# Patient Record
Sex: MALE | Race: WHITE | Employment: OTHER | URBAN - METROPOLITAN AREA
[De-identification: names, ages, dates, MRNs, and addresses within clinical notes are randomized per-mention and may not be internally consistent; named-entity substitution may affect disease eponyms.]

---

## 2020-07-07 ENCOUNTER — APPOINTMENT (OUTPATIENT)
Dept: GENERAL RADIOLOGY | Facility: CLINIC | Age: 40
DRG: 494 | End: 2020-07-07
Attending: EMERGENCY MEDICINE
Payer: COMMERCIAL

## 2020-07-07 ENCOUNTER — HOSPITAL ENCOUNTER (INPATIENT)
Facility: CLINIC | Age: 40
LOS: 2 days | Discharge: HOME | DRG: 494 | End: 2020-07-09
Attending: EMERGENCY MEDICINE | Admitting: ORTHOPAEDIC SURGERY
Payer: COMMERCIAL

## 2020-07-07 DIAGNOSIS — S82.202A CLOSED FRACTURE OF LEFT TIBIA AND FIBULA, INITIAL ENCOUNTER: ICD-10-CM

## 2020-07-07 DIAGNOSIS — S82.402A CLOSED FRACTURE OF LEFT TIBIA AND FIBULA, INITIAL ENCOUNTER: ICD-10-CM

## 2020-07-07 PROBLEM — S82.309A CLOSED FRACTURE OF DISTAL TIBIA: Status: ACTIVE | Noted: 2020-07-07

## 2020-07-07 LAB
ABO + RH BLD: NORMAL
ABO + RH BLD: NORMAL
ANION GAP SERPL CALCULATED.3IONS-SCNC: 6 MMOL/L (ref 3–14)
BASOPHILS # BLD AUTO: 0 10E9/L (ref 0–0.2)
BASOPHILS NFR BLD AUTO: 0 %
BLD GP AB SCN SERPL QL: NORMAL
BLOOD BANK CMNT PATIENT-IMP: NORMAL
BUN SERPL-MCNC: 16 MG/DL (ref 7–30)
CALCIUM SERPL-MCNC: 8.6 MG/DL (ref 8.5–10.1)
CHLORIDE SERPL-SCNC: 107 MMOL/L (ref 94–109)
CO2 SERPL-SCNC: 27 MMOL/L (ref 20–32)
CREAT SERPL-MCNC: 1.22 MG/DL (ref 0.66–1.25)
DIFFERENTIAL METHOD BLD: ABNORMAL
EOSINOPHIL # BLD AUTO: 0.6 10E9/L (ref 0–0.7)
EOSINOPHIL NFR BLD AUTO: 5 %
ERYTHROCYTE [DISTWIDTH] IN BLOOD BY AUTOMATED COUNT: 12.8 % (ref 10–15)
ERYTHROCYTE [DISTWIDTH] IN BLOOD BY AUTOMATED COUNT: 12.8 % (ref 10–15)
ETHANOL SERPL-MCNC: <0.01 G/DL
GFR SERPL CREATININE-BSD FRML MDRD: 74 ML/MIN/{1.73_M2}
GLUCOSE SERPL-MCNC: 146 MG/DL (ref 70–99)
HCT VFR BLD AUTO: 46.6 % (ref 40–53)
HCT VFR BLD AUTO: 46.8 % (ref 40–53)
HGB BLD-MCNC: 15.1 G/DL (ref 13.3–17.7)
HGB BLD-MCNC: 15.5 G/DL (ref 13.3–17.7)
LYMPHOCYTES # BLD AUTO: 3.6 10E9/L (ref 0.8–5.3)
LYMPHOCYTES NFR BLD AUTO: 30 %
MCH RBC QN AUTO: 27.8 PG (ref 26.5–33)
MCH RBC QN AUTO: 28.3 PG (ref 26.5–33)
MCHC RBC AUTO-ENTMCNC: 32.4 G/DL (ref 31.5–36.5)
MCHC RBC AUTO-ENTMCNC: 33.1 G/DL (ref 31.5–36.5)
MCV RBC AUTO: 85 FL (ref 78–100)
MCV RBC AUTO: 86 FL (ref 78–100)
METAMYELOCYTES # BLD: 0.1 10E9/L
METAMYELOCYTES NFR BLD MANUAL: 1 %
MONOCYTES # BLD AUTO: 0.7 10E9/L (ref 0–1.3)
MONOCYTES NFR BLD AUTO: 6 %
NEUTROPHILS # BLD AUTO: 6.9 10E9/L (ref 1.6–8.3)
NEUTROPHILS NFR BLD AUTO: 58 %
PLATELET # BLD AUTO: 282 10E9/L (ref 150–450)
PLATELET # BLD AUTO: 375 10E9/L (ref 150–450)
PLATELET # BLD EST: ABNORMAL 10*3/UL
POTASSIUM SERPL-SCNC: 3 MMOL/L (ref 3.4–5.3)
POTASSIUM SERPL-SCNC: 3.5 MMOL/L (ref 3.4–5.3)
RBC # BLD AUTO: 5.43 10E12/L (ref 4.4–5.9)
RBC # BLD AUTO: 5.48 10E12/L (ref 4.4–5.9)
RBC MORPH BLD: ABNORMAL
SARS-COV-2 PCR COMMENT: NORMAL
SARS-COV-2 RNA SPEC QL NAA+PROBE: NEGATIVE
SARS-COV-2 RNA SPEC QL NAA+PROBE: NORMAL
SODIUM SERPL-SCNC: 140 MMOL/L (ref 133–144)
SPECIMEN EXP DATE BLD: NORMAL
SPECIMEN SOURCE: NORMAL
SPECIMEN SOURCE: NORMAL
VARIANT LYMPHS BLD QL SMEAR: PRESENT
WBC # BLD AUTO: 11.9 10E9/L (ref 4–11)
WBC # BLD AUTO: 18.3 10E9/L (ref 4–11)

## 2020-07-07 PROCEDURE — 86900 BLOOD TYPING SEROLOGIC ABO: CPT | Performed by: EMERGENCY MEDICINE

## 2020-07-07 PROCEDURE — 25800030 ZZH RX IP 258 OP 636: Performed by: PHYSICIAN ASSISTANT

## 2020-07-07 PROCEDURE — 29505 APPLICATION LONG LEG SPLINT: CPT

## 2020-07-07 PROCEDURE — 25800030 ZZH RX IP 258 OP 636: Performed by: EMERGENCY MEDICINE

## 2020-07-07 PROCEDURE — 96375 TX/PRO/DX INJ NEW DRUG ADDON: CPT

## 2020-07-07 PROCEDURE — 96374 THER/PROPH/DIAG INJ IV PUSH: CPT

## 2020-07-07 PROCEDURE — 36415 COLL VENOUS BLD VENIPUNCTURE: CPT | Performed by: PHYSICIAN ASSISTANT

## 2020-07-07 PROCEDURE — 2W3RX1Z IMMOBILIZATION OF LEFT LOWER LEG USING SPLINT: ICD-10-PCS | Performed by: EMERGENCY MEDICINE

## 2020-07-07 PROCEDURE — 96376 TX/PRO/DX INJ SAME DRUG ADON: CPT

## 2020-07-07 PROCEDURE — 73600 X-RAY EXAM OF ANKLE: CPT | Mod: LT

## 2020-07-07 PROCEDURE — 86850 RBC ANTIBODY SCREEN: CPT | Performed by: EMERGENCY MEDICINE

## 2020-07-07 PROCEDURE — 86901 BLOOD TYPING SEROLOGIC RH(D): CPT | Performed by: EMERGENCY MEDICINE

## 2020-07-07 PROCEDURE — 85025 COMPLETE CBC W/AUTO DIFF WBC: CPT | Performed by: EMERGENCY MEDICINE

## 2020-07-07 PROCEDURE — 25000132 ZZH RX MED GY IP 250 OP 250 PS 637: Performed by: PHYSICIAN ASSISTANT

## 2020-07-07 PROCEDURE — C9803 HOPD COVID-19 SPEC COLLECT: HCPCS

## 2020-07-07 PROCEDURE — 96361 HYDRATE IV INFUSION ADD-ON: CPT

## 2020-07-07 PROCEDURE — 73590 X-RAY EXAM OF LOWER LEG: CPT | Mod: LT

## 2020-07-07 PROCEDURE — 36415 COLL VENOUS BLD VENIPUNCTURE: CPT | Performed by: ORTHOPAEDIC SURGERY

## 2020-07-07 PROCEDURE — 80048 BASIC METABOLIC PNL TOTAL CA: CPT | Performed by: EMERGENCY MEDICINE

## 2020-07-07 PROCEDURE — 25000128 H RX IP 250 OP 636: Performed by: EMERGENCY MEDICINE

## 2020-07-07 PROCEDURE — 80320 DRUG SCREEN QUANTALCOHOLS: CPT | Performed by: EMERGENCY MEDICINE

## 2020-07-07 PROCEDURE — 84132 ASSAY OF SERUM POTASSIUM: CPT | Performed by: ORTHOPAEDIC SURGERY

## 2020-07-07 PROCEDURE — 85027 COMPLETE CBC AUTOMATED: CPT | Performed by: PHYSICIAN ASSISTANT

## 2020-07-07 PROCEDURE — 12000000 ZZH R&B MED SURG/OB

## 2020-07-07 PROCEDURE — 68300004 ZZH PARTIAL TRAUMA W/O CC LEVEL III

## 2020-07-07 PROCEDURE — 99285 EMERGENCY DEPT VISIT HI MDM: CPT | Mod: 25

## 2020-07-07 PROCEDURE — U0003 INFECTIOUS AGENT DETECTION BY NUCLEIC ACID (DNA OR RNA); SEVERE ACUTE RESPIRATORY SYNDROME CORONAVIRUS 2 (SARS-COV-2) (CORONAVIRUS DISEASE [COVID-19]), AMPLIFIED PROBE TECHNIQUE, MAKING USE OF HIGH THROUGHPUT TECHNOLOGIES AS DESCRIBED BY CMS-2020-01-R: HCPCS | Performed by: EMERGENCY MEDICINE

## 2020-07-07 RX ORDER — POTASSIUM CHLORIDE 1.5 G/1.58G
20-40 POWDER, FOR SOLUTION ORAL
Status: DISCONTINUED | OUTPATIENT
Start: 2020-07-07 | End: 2020-07-09 | Stop reason: HOSPADM

## 2020-07-07 RX ORDER — AMOXICILLIN 250 MG
2 CAPSULE ORAL 2 TIMES DAILY
Status: DISCONTINUED | OUTPATIENT
Start: 2020-07-07 | End: 2020-07-08

## 2020-07-07 RX ORDER — IBUPROFEN 600 MG/1
600 TABLET, FILM COATED ORAL EVERY 6 HOURS PRN
Status: DISCONTINUED | OUTPATIENT
Start: 2020-07-07 | End: 2020-07-09 | Stop reason: HOSPADM

## 2020-07-07 RX ORDER — POTASSIUM CHLORIDE 1500 MG/1
20-40 TABLET, EXTENDED RELEASE ORAL
Status: DISCONTINUED | OUTPATIENT
Start: 2020-07-07 | End: 2020-07-09 | Stop reason: HOSPADM

## 2020-07-07 RX ORDER — POTASSIUM CL/LIDO/0.9 % NACL 10MEQ/0.1L
10 INTRAVENOUS SOLUTION, PIGGYBACK (ML) INTRAVENOUS
Status: DISCONTINUED | OUTPATIENT
Start: 2020-07-07 | End: 2020-07-09 | Stop reason: HOSPADM

## 2020-07-07 RX ORDER — POTASSIUM CHLORIDE 7.45 MG/ML
10 INJECTION INTRAVENOUS
Status: DISCONTINUED | OUTPATIENT
Start: 2020-07-07 | End: 2020-07-09 | Stop reason: HOSPADM

## 2020-07-07 RX ORDER — ONDANSETRON 2 MG/ML
4 INJECTION INTRAMUSCULAR; INTRAVENOUS ONCE
Status: COMPLETED | OUTPATIENT
Start: 2020-07-07 | End: 2020-07-07

## 2020-07-07 RX ORDER — ONDANSETRON 2 MG/ML
4 INJECTION INTRAMUSCULAR; INTRAVENOUS EVERY 6 HOURS PRN
Status: DISCONTINUED | OUTPATIENT
Start: 2020-07-07 | End: 2020-07-08

## 2020-07-07 RX ORDER — HYDROMORPHONE HYDROCHLORIDE 1 MG/ML
.3-.5 INJECTION, SOLUTION INTRAMUSCULAR; INTRAVENOUS; SUBCUTANEOUS
Status: DISCONTINUED | OUTPATIENT
Start: 2020-07-07 | End: 2020-07-08

## 2020-07-07 RX ORDER — ALBUTEROL SULFATE 90 UG/1
2 AEROSOL, METERED RESPIRATORY (INHALATION) EVERY 4 HOURS PRN
COMMUNITY

## 2020-07-07 RX ORDER — OXYCODONE HYDROCHLORIDE 5 MG/1
5-10 TABLET ORAL
Status: DISCONTINUED | OUTPATIENT
Start: 2020-07-07 | End: 2020-07-08

## 2020-07-07 RX ORDER — NALOXONE HYDROCHLORIDE 0.4 MG/ML
.1-.4 INJECTION, SOLUTION INTRAMUSCULAR; INTRAVENOUS; SUBCUTANEOUS
Status: DISCONTINUED | OUTPATIENT
Start: 2020-07-07 | End: 2020-07-08

## 2020-07-07 RX ORDER — ONDANSETRON 4 MG/1
4 TABLET, ORALLY DISINTEGRATING ORAL EVERY 6 HOURS PRN
Status: DISCONTINUED | OUTPATIENT
Start: 2020-07-07 | End: 2020-07-08

## 2020-07-07 RX ORDER — PROCHLORPERAZINE 25 MG
25 SUPPOSITORY, RECTAL RECTAL EVERY 12 HOURS PRN
Status: DISCONTINUED | OUTPATIENT
Start: 2020-07-07 | End: 2020-07-08

## 2020-07-07 RX ORDER — PROCHLORPERAZINE MALEATE 10 MG
10 TABLET ORAL EVERY 6 HOURS PRN
Status: DISCONTINUED | OUTPATIENT
Start: 2020-07-07 | End: 2020-07-08

## 2020-07-07 RX ORDER — AMOXICILLIN 250 MG
1 CAPSULE ORAL 2 TIMES DAILY
Status: DISCONTINUED | OUTPATIENT
Start: 2020-07-07 | End: 2020-07-08

## 2020-07-07 RX ORDER — LIDOCAINE 40 MG/G
CREAM TOPICAL
Status: DISCONTINUED | OUTPATIENT
Start: 2020-07-07 | End: 2020-07-08

## 2020-07-07 RX ORDER — ACETAMINOPHEN 325 MG/1
650 TABLET ORAL EVERY 4 HOURS PRN
Status: DISCONTINUED | OUTPATIENT
Start: 2020-07-07 | End: 2020-07-08

## 2020-07-07 RX ORDER — POTASSIUM CHLORIDE 29.8 MG/ML
20 INJECTION INTRAVENOUS
Status: DISCONTINUED | OUTPATIENT
Start: 2020-07-07 | End: 2020-07-09 | Stop reason: HOSPADM

## 2020-07-07 RX ORDER — SODIUM CHLORIDE 9 MG/ML
INJECTION, SOLUTION INTRAVENOUS CONTINUOUS
Status: DISCONTINUED | OUTPATIENT
Start: 2020-07-07 | End: 2020-07-08

## 2020-07-07 RX ADMIN — SODIUM CHLORIDE 1000 ML: 9 INJECTION, SOLUTION INTRAVENOUS at 11:13

## 2020-07-07 RX ADMIN — POTASSIUM CHLORIDE 40 MEQ: 1500 TABLET, EXTENDED RELEASE ORAL at 16:05

## 2020-07-07 RX ADMIN — HYDROMORPHONE HYDROCHLORIDE 1 MG: 1 INJECTION, SOLUTION INTRAMUSCULAR; INTRAVENOUS; SUBCUTANEOUS at 11:10

## 2020-07-07 RX ADMIN — ONDANSETRON 4 MG: 2 INJECTION INTRAMUSCULAR; INTRAVENOUS at 11:07

## 2020-07-07 RX ADMIN — SODIUM CHLORIDE: 9 INJECTION, SOLUTION INTRAVENOUS at 15:21

## 2020-07-07 RX ADMIN — HYDROMORPHONE HYDROCHLORIDE 1 MG: 1 INJECTION, SOLUTION INTRAMUSCULAR; INTRAVENOUS; SUBCUTANEOUS at 12:04

## 2020-07-07 RX ADMIN — OXYCODONE HYDROCHLORIDE 10 MG: 5 TABLET ORAL at 23:24

## 2020-07-07 RX ADMIN — OXYCODONE HYDROCHLORIDE 5 MG: 5 TABLET ORAL at 16:32

## 2020-07-07 RX ADMIN — ACETAMINOPHEN 650 MG: 325 TABLET, FILM COATED ORAL at 22:13

## 2020-07-07 RX ADMIN — POTASSIUM CHLORIDE 20 MEQ: 1500 TABLET, EXTENDED RELEASE ORAL at 18:50

## 2020-07-07 RX ADMIN — ONDANSETRON 4 MG: 2 INJECTION INTRAMUSCULAR; INTRAVENOUS at 12:04

## 2020-07-07 RX ADMIN — ACETAMINOPHEN 650 MG: 325 TABLET, FILM COATED ORAL at 16:32

## 2020-07-07 RX ADMIN — OXYCODONE HYDROCHLORIDE 5 MG: 5 TABLET ORAL at 20:19

## 2020-07-07 RX ADMIN — SENNOSIDES AND DOCUSATE SODIUM 1 TABLET: 8.6; 5 TABLET ORAL at 20:20

## 2020-07-07 ASSESSMENT — ENCOUNTER SYMPTOMS
HEADACHES: 0
CHEST TIGHTNESS: 0
ABDOMINAL PAIN: 0
NECK PAIN: 0
MYALGIAS: 0
WOUND: 0

## 2020-07-07 ASSESSMENT — MIFFLIN-ST. JEOR: SCORE: 1892.87

## 2020-07-07 ASSESSMENT — ACTIVITIES OF DAILY LIVING (ADL)
ADLS_ACUITY_SCORE: 20
ADLS_ACUITY_SCORE: 18

## 2020-07-07 NOTE — ED NOTES
Bed: ED33  Expected date: 7/7/20  Expected time: 10:38 AM  Means of arrival: Ambulance  Comments:  bv3 39M fall   Tib/fib defor

## 2020-07-07 NOTE — ED PROVIDER NOTES
History     Chief Complaint:  Fall    HPI   Damián Petit is a 39 year old male who presents to the ED via EMS for evaluation of leg pain after a fall. The patient reports that he fell off a seven foot ladder and landed on his left foot earlier this morning. He experienced the immediate onset of leg pain and noticed a deformity to his lower left leg, prompting him to call EMS. EMS did apply a splint at the scene and administered 100 mcg fentanyl IV prior to arrival. Here in the ED, the patient rates his pain as a 4/10. He denies that he hit his head or lost consciousness, and he denies any other pains including abdominal pain, back pain, or headache. He is not anticoagulated.      Allergies:  No known drug allergies     Medications:    Albuterol    Past Medical History:    Asthma  Obesity  Allergic rhinitis    Past Surgical History:    Tonsil and Adenoidectomy  Hamilton teeth extraction  Anterior cruciate ligament repair - left  ACL reconstruction - right    Family History:    Hypertension - father  Heart disease - father  Asthma - sister    Social History:  Smoking status: Never  Alcohol use: Yes  Drug use: No  PCP: Physician No Ref-Primary  Marital Status:  Single [1]     Review of Systems   Respiratory: Negative for chest tightness.    Cardiovascular: Negative for chest pain.   Gastrointestinal: Negative for abdominal pain.   Musculoskeletal: Negative for myalgias and neck pain.        Deformity and pain of left lower leg   Skin: Negative for wound.   Neurological: Negative for syncope and headaches.   All other systems reviewed and are negative.    Physical Exam     Patient Vitals for the past 24 hrs:   BP Temp Temp src Pulse Resp SpO2   07/07/20 1050 106/66 98  F (36.7  C) Oral 66 18 97 %       Physical Exam  General: Alert. Appears uncomfortable  Head:  The scalp, face, and head appear normal without trauma  Eyes:  Sclera white; Pupils are equal and round  ENT:    External ears normal.  No hemotympanum.       External nares normal.  No septal hematoma.    Neck:  No midline tenderness or pain with full ROM.  CV:  Rate as above with regular rhythm   No murmur   2/2 radial and dorsal pedal pulses  Resp:  Breath sounds clear and equal bilaterally    Non-labored, no retractions or accessory muscle use  GI:  Abdomen soft, non-tender, non-distended    No rebound tenderness or guarding  MSK:  No midline tenderness or bony step-off    Obvious deformity to L distal tibia, no open fracture identified; decreased ROM LLE 2/2 to pain  Skin:  No rash or lesions noted. Slight abrasion to L. Anterior sow  Neuro:   No apparent deficit.    Strength 5/5 x 3 in all extremities except LLE  Sensation intact x4.     GCS: 15  Psych:  Normal affect.        Emergency Department Course   Imaging:  Radiology findings were communicated with the patient who voiced understanding of the findings.    XR Tibia & Fibula, 2 views, left:  Left tibial distal diaphyseal oblique or spiral fracture   with approximately one shaft width lateral displacement of the distal   fragment and approximately 1.5 cm overriding of the fracture   fragments. Adjacent fibular comminuted fracture is also noted. No   evidence of acute ankle fracture. The ankle mortise appears congruent.   At the knee, ACL reconstruction interference screws are noted.   Imaging independently reviewed and agree with radiologist interpretation.      XR Ankle, 2 views, left:  Left tibial distal diaphyseal oblique or spiral fracture   with approximately one shaft width lateral displacement of the distal   fragment and approximately 1.5 cm overriding of the fracture   fragments. Adjacent fibular comminuted fracture is also noted. No   evidence of acute ankle fracture. The ankle mortise appears congruent.   At the knee, ACL reconstruction interference screws are noted.   Imaging independently reviewed and agree with radiologist interpretation.      Laboratory:  Laboratory findings were communicated  "with the patient who voiced understanding of the findings.    CBC: WBC 11.9 (H) o/w WNL. (HGB 15.1, )     BMP: Glucose 146 (H), Potassium 3.0 (L), o/w WNL (Creatinine: 1.22)    Alcohol Blood Level: <0.01     ABO/Rh type and screen: O positive, Antibody negative    Asymptomatic COVID-19 Virus (Coronavirus) by PCR: pending     Procedures    Narrative: Left posterior long leg splint   4\" orthopedic glass long posterior leg splint was applied and after placement I checked and adjusted the fit to ensure proper positioning. Patient was more comfortable with splint in place. Sensation and circulation are intact after splint placement.    Interventions:  1107 Zofran 4 mg IV  1110 Dilaudid 1 mg IV  1113 NS, 1 L, IV  1204 Dilaudid 1 mg IV  1204 Zofran 4 mg IV    Emergency Department Course:  Past medical records, nursing notes, and vitals reviewed.    1052 I performed an exam of the patient as documented above.     1054 Partial trauma was called at this time.    1100 FAST exam performed. Negative FAST    IV was inserted and blood was drawn for laboratory testing, results above.    The patient was sent for an X-ray Ankle and an X-ray Tibia and Fibula while in the emergency department, results above.     1124 I consulted with [facility], regarding the patient's history and presentation here in the emergency department.    1220 I rechecked the patient and discussed the results of his workup thus far.     (1228) I consulted with Gerda Grewal, again, regarding the patient's history and presentation here in the emergency department. She recommended that the patient be admitted.    1314 I performed a splint placement, as noted above. There were no complications of the procedure.    Findings and plan explained to the Patient who consents to admission. Discussed the patient with Dr. Degroot, who will admit the patient to a Adult Med/Surg bed for further monitoring, evaluation, and treatment.    I " personally reviewed the laboratory and imaging results with the Patient and answered all related questions prior to admission.     Impression & Plan   Trauma:  Level of trauma activation: Partial  C-collar and immobilization: not indicated, cleared.  CSpine Clearance: C spine cleared clinically  GCS at arrival: 15  GCS at disposition: unchanged  Full Primary and Secondary survey with appropriate immobilization of spine completed in exam section.  Consults prior to admission or transfer: Orthopedics called at 1224  Procedures done in the ED: Splinting of LLE in posterior long leg splint, CMS intact post procedure    Medical Decision Making:  Patient is a 40 yo male who presents after a fall after a ladder with obvious LLE deformity.  Patient neurovascularly intact, no evidence to suggest open fracture.  Patient with complicated tibia and fibular fractures.  Patient splinted for comfort.  Given complex fracture and risk for compartment syndrome I did discuss case with orthopedics who is in agreement to admission at this time.  Patient's pain was controlled during time in the ED.  The remainder of head to toe exam is without trauma.  He remained hemodynamically stable.     Diagnosis:    ICD-10-CM    1. Closed fracture of left tibia and fibula, initial encounter  S82.202A     S82.402A        Disposition:  Admitted to hospital.    Scribe Disclosure:  IThang, am serving as a scribe at 11:05 AM on 7/7/2020 to document services personally performed by Raven Caldwell DO based on my observations and the provider's statements to me.     Scribe Disclosure:  I, Ghazal Villalba, am serving as a scribe on 7/7/2020 at 2:33 PM to personally document services performed by Ramakrishna Degroot MD based on my observations and the provider's statements to me.         Raevn Caldwell DO  07/07/20 [id number]

## 2020-07-07 NOTE — ED NOTES
[facility]  ED Nurse Handoff Report    Damián Petit is a 39 year old male   ED Chief complaint: Fall  . ED Diagnosis:   Final diagnoses:   Closed fracture of left tibia and fibula, initial encounter     Allergies: No Known Allergies    Code Status: Full Code  Activity level - Baseline/Home:  Independent. Activity Level - Current:   Stand by Assist. Lift room needed: No. Bariatric: No   Needed: No   Isolation: No. Infection: Not Applicable.     Vital Signs:   Vitals:    07/07/20 1150 07/07/20 1200 07/07/20 1210 07/07/20 1320   BP: 135/80 134/81 136/85 (!) 152/89   Pulse:  70     Resp:       Temp:       TempSrc:       SpO2: 96% 97% 97% 98%       Cardiac Rhythm:  ,      Pain level: 0-10 Pain Scale: 3  Patient confused: No. Patient Falls Risk: Yes.   Elimination Status: Has voided   Patient Report - Initial Complaint: Fall from ladder 7 ft deformity to LLE. Focused Assessment: deformity swelling pain present LLE CMS intact.   Tests Performed:   Labs Ordered and Resulted from Time of ED Arrival Up to the Time of Departure from the ED   CBC WITH PLATELETS DIFFERENTIAL - Abnormal; Notable for the following components:       Result Value    WBC 11.9 (*)     Absolute Metamyelocytes 0.1 (*)     All other components within normal limits   BASIC METABOLIC PANEL - Abnormal; Notable for the following components:    Potassium 3.0 (*)     Glucose 146 (*)     All other components within normal limits   ALCOHOL ETHYL   CBC WITH PLATELETS   COVID-19 VIRUS (CORONAVIRUS) BY PCR   VITAL SIGNS   PERIPHERAL IV CATHETER   ACTIVITY   PAIN ASSESSMENT   CMS   APPLY PNEUMATIC COMPRESSION DEVICE (PCD)   ABO/RH TYPE AND SCREEN      Abnormal Results:   XR Tibia & Fibula Left 2 Views   Final Result   IMPRESSION: Left tibial distal diaphyseal oblique or spiral fracture   with approximately one shaft width lateral displacement of the distal   fragment and approximately 1.5 cm overriding of the fracture   fragments. Adjacent  fibular comminuted fracture is also noted. No   evidence of acute ankle fracture. The ankle mortise appears congruent.   At the knee, ACL reconstruction interference screws are noted.      AKUA FREEMAN MD      XR Ankle Left 2 Views   Final Result   IMPRESSION: Left tibial distal diaphyseal oblique or spiral fracture   with approximately one shaft width lateral displacement of the distal   fragment and approximately 1.5 cm overriding of the fracture   fragments. Adjacent fibular comminuted fracture is also noted. No   evidence of acute ankle fracture. The ankle mortise appears congruent.   At the knee, ACL reconstruction interference screws are noted.      AKUA FREEMAN MD           Treatments provided: Pain management, splinted  Family Comments: In attendance   OBS brochure/video discussed/provided to patient:  N/A  ED Medications:   Medications   lidocaine 1 % 0.1-1 mL (has no administration in time range)   lidocaine (LMX4) cream (has no administration in time range)   sodium chloride (PF) 0.9% PF flush 3 mL (has no administration in time range)   sodium chloride (PF) 0.9% PF flush 3 mL (has no administration in time range)   naloxone (NARCAN) injection 0.1-0.4 mg (has no administration in time range)   melatonin tablet 1 mg (has no administration in time range)   sodium chloride 0.9% infusion (has no administration in time range)   acetaminophen (TYLENOL) tablet 650 mg (has no administration in time range)   ibuprofen (ADVIL/MOTRIN) tablet 600 mg (has no administration in time range)   oxyCODONE (ROXICODONE) tablet 5-10 mg (has no administration in time range)   HYDROmorphone (PF) (DILAUDID) injection 0.3-0.5 mg (has no administration in time range)   senna-docusate (SENOKOT-S/PERICOLACE) 8.6-50 MG per tablet 1 tablet (has no administration in time range)     Or   senna-docusate (SENOKOT-S/PERICOLACE) 8.6-50 MG per tablet 2 tablet (has no administration in time range)   magnesium hydroxide (MILK OF MAGNESIA)  suspension 30 mL (has no administration in time range)   ondansetron (ZOFRAN-ODT) ODT tab 4 mg (has no administration in time range)     Or   ondansetron (ZOFRAN) injection 4 mg (has no administration in time range)   prochlorperazine (COMPAZINE) injection 10 mg (has no administration in time range)     Or   prochlorperazine (COMPAZINE) tablet 10 mg (has no administration in time range)     Or   prochlorperazine (COMPAZINE) Suppository 25 mg (has no administration in time range)   0.9% sodium chloride BOLUS (0 mLs Intravenous Stopped 7/7/20 1202)   HYDROmorphone (DILAUDID) injection 1 mg (1 mg Intravenous Given 7/7/20 1110)   ondansetron (ZOFRAN) injection 4 mg (4 mg Intravenous Given 7/7/20 1107)   HYDROmorphone (DILAUDID) injection 1 mg (1 mg Intravenous Given 7/7/20 1204)   ondansetron (ZOFRAN) injection 4 mg (4 mg Intravenous Given 7/7/20 1204)     Drips infusing:  No  For the majority of the shift, the patient's behavior Green. Interventions performed were N/A.    Sepsis treatment initiated: No   RECEIVING UNIT ED HANDOFF REVIEW    Above ED Nurse Handoff Report was reviewed: YES  Reviewed by: Constance Ruff RN on July 7, 2020 at 1:51 PM       ED Nurse Name/Phone Number: Zarina Cool RN,   1:21 PM

## 2020-07-07 NOTE — PHARMACY-ADMISSION MEDICATION HISTORY
Admission medication history interview status for this patient is complete. See [facility] admission navigator for allergy information, prior to admission medications and immunization status.     Medication history interview done via telephone during Covid-19 pandemic, indicate source(s): Patient  Medication history resources (including written lists, pill bottles, clinic record): called [facility] pharmacy    Changes made to PTA medication list:  Added: all meds on list  Deleted: n/a  Changed: n/a    Actions taken by pharmacist (provider contacted, etc):None     Additional medication history information: Last maintenance inhaler prescribed was Breo Ellipta 200-25, 1 puff daily. Per pharmacy, he didn't fill or  this prescription yet although the RX was send in January. Last maintenance inhaler filled was Dulera as shown on list below.     Medication reconciliation/reorder completed by provider prior to medication history?  N   (Y/N)     For patients on insulin therapy: N  (Y/N)      Prior to Admission medications    Medication Sig Last Dose Taking? Auth Provider   mometasone-formoterol (DULERA) 200-5 MCG/ACT inhaler Inhale 2 puffs into the lungs 2 times daily Past Week at Unknown time Yes Unknown, Entered By History   albuterol (PROAIR HFA/PROVENTIL HFA/VENTOLIN HFA) 108 (90 Base) MCG/ACT inhaler Inhale 2 puffs into the lungs every 4 hours as needed for shortness of breath / dyspnea or wheezing More than a month at Unknown time  Unknown, Entered By History

## 2020-07-07 NOTE — ED TRIAGE NOTES
Patient presents to the ED via ambulance following a fall. Fell approximately 7 feet off a ladder. Presents with pain to the left lower leg. Deformity noted.

## 2020-07-07 NOTE — PROGRESS NOTES
Aware of patient in ED with left distal     Long leg splint  Elevate with foam wedge  Bed rest  neuro checks q2 hrs   Pain medication as needed  NPO at midnight  Will schedule for IM nail tomorrow AM with Dr. Degroot    Please call if patient has concerns of compartment syndrome - pain not controllable by medications, pulseless, pallor, paresthesias    Gerda Grewal PAC  [phone]

## 2020-07-08 ENCOUNTER — ANESTHESIA (OUTPATIENT)
Dept: SURGERY | Facility: CLINIC | Age: 40
DRG: 494 | End: 2020-07-08
Payer: COMMERCIAL

## 2020-07-08 ENCOUNTER — APPOINTMENT (OUTPATIENT)
Dept: GENERAL RADIOLOGY | Facility: CLINIC | Age: 40
DRG: 494 | End: 2020-07-08
Attending: ORTHOPAEDIC SURGERY
Payer: COMMERCIAL

## 2020-07-08 ENCOUNTER — ANESTHESIA EVENT (OUTPATIENT)
Dept: SURGERY | Facility: CLINIC | Age: 40
DRG: 494 | End: 2020-07-08
Payer: COMMERCIAL

## 2020-07-08 PROBLEM — S82.209A FRACTURE TIBIA/FIBULA: Status: ACTIVE | Noted: 2020-07-08

## 2020-07-08 PROBLEM — S82.409A FRACTURE TIBIA/FIBULA: Status: ACTIVE | Noted: 2020-07-08

## 2020-07-08 LAB — POTASSIUM SERPL-SCNC: 3.7 MMOL/L (ref 3.4–5.3)

## 2020-07-08 PROCEDURE — 25000125 ZZHC RX 250: Performed by: NURSE ANESTHETIST, CERTIFIED REGISTERED

## 2020-07-08 PROCEDURE — 25800030 ZZH RX IP 258 OP 636: Performed by: NURSE ANESTHETIST, CERTIFIED REGISTERED

## 2020-07-08 PROCEDURE — 12000000 ZZH R&B MED SURG/OB

## 2020-07-08 PROCEDURE — 27210794 ZZH OR GENERAL SUPPLY STERILE: Performed by: ORTHOPAEDIC SURGERY

## 2020-07-08 PROCEDURE — C1713 ANCHOR/SCREW BN/BN,TIS/BN: HCPCS | Performed by: ORTHOPAEDIC SURGERY

## 2020-07-08 PROCEDURE — 40000306 ZZH STATISTIC PRE PROC ASSESS II: Performed by: ORTHOPAEDIC SURGERY

## 2020-07-08 PROCEDURE — 25000128 H RX IP 250 OP 636: Performed by: PHYSICIAN ASSISTANT

## 2020-07-08 PROCEDURE — 40000275 ZZH STATISTIC RCP TIME EA 10 MIN

## 2020-07-08 PROCEDURE — 0QSK04Z REPOSITION LEFT FIBULA WITH INTERNAL FIXATION DEVICE, OPEN APPROACH: ICD-10-PCS | Performed by: ORTHOPAEDIC SURGERY

## 2020-07-08 PROCEDURE — C1769 GUIDE WIRE: HCPCS | Performed by: ORTHOPAEDIC SURGERY

## 2020-07-08 PROCEDURE — 25800030 ZZH RX IP 258 OP 636: Performed by: PHYSICIAN ASSISTANT

## 2020-07-08 PROCEDURE — 25000125 ZZHC RX 250: Performed by: ANESTHESIOLOGY

## 2020-07-08 PROCEDURE — 25000132 ZZH RX MED GY IP 250 OP 250 PS 637: Performed by: NURSE ANESTHETIST, CERTIFIED REGISTERED

## 2020-07-08 PROCEDURE — 25000132 ZZH RX MED GY IP 250 OP 250 PS 637: Performed by: PHYSICIAN ASSISTANT

## 2020-07-08 PROCEDURE — 25800030 ZZH RX IP 258 OP 636: Performed by: ANESTHESIOLOGY

## 2020-07-08 PROCEDURE — 94640 AIRWAY INHALATION TREATMENT: CPT

## 2020-07-08 PROCEDURE — 25000128 H RX IP 250 OP 636: Performed by: ANESTHESIOLOGY

## 2020-07-08 PROCEDURE — 36415 COLL VENOUS BLD VENIPUNCTURE: CPT | Performed by: ORTHOPAEDIC SURGERY

## 2020-07-08 PROCEDURE — 71000012 ZZH RECOVERY PHASE 1 LEVEL 1 FIRST HR: Performed by: ORTHOPAEDIC SURGERY

## 2020-07-08 PROCEDURE — 40000277 XR SURGERY CARM FLUORO LESS THAN 5 MIN W STILLS

## 2020-07-08 PROCEDURE — 36000058 ZZH SURGERY LEVEL 3 EA 15 ADDTL MIN: Performed by: ORTHOPAEDIC SURGERY

## 2020-07-08 PROCEDURE — 25000125 ZZHC RX 250: Performed by: ORTHOPAEDIC SURGERY

## 2020-07-08 PROCEDURE — 71000013 ZZH RECOVERY PHASE 1 LEVEL 1 EA ADDTL HR: Performed by: ORTHOPAEDIC SURGERY

## 2020-07-08 PROCEDURE — 37000008 ZZH ANESTHESIA TECHNICAL FEE, 1ST 30 MIN: Performed by: ORTHOPAEDIC SURGERY

## 2020-07-08 PROCEDURE — 36000060 ZZH SURGERY LEVEL 3 W FLUORO 1ST 30 MIN: Performed by: ORTHOPAEDIC SURGERY

## 2020-07-08 PROCEDURE — 94660 CPAP INITIATION&MGMT: CPT

## 2020-07-08 PROCEDURE — 25000128 H RX IP 250 OP 636: Performed by: NURSE ANESTHETIST, CERTIFIED REGISTERED

## 2020-07-08 PROCEDURE — 37000009 ZZH ANESTHESIA TECHNICAL FEE, EACH ADDTL 15 MIN: Performed by: ORTHOPAEDIC SURGERY

## 2020-07-08 PROCEDURE — 84132 ASSAY OF SERUM POTASSIUM: CPT | Performed by: ORTHOPAEDIC SURGERY

## 2020-07-08 DEVICE — IMP SCR SYN 5.0 TI LOCK T25 STARDRIVE 32MM 04.005.522S: Type: IMPLANTABLE DEVICE | Site: LEG | Status: FUNCTIONAL

## 2020-07-08 DEVICE — IMPLANTABLE DEVICE: Type: IMPLANTABLE DEVICE | Site: LEG | Status: FUNCTIONAL

## 2020-07-08 DEVICE — IMP SCR SYN 5.0 TI LOCK T25 STARDRIVE 44MM 04.005.534S: Type: IMPLANTABLE DEVICE | Site: LEG | Status: FUNCTIONAL

## 2020-07-08 DEVICE — IMP SCR SYN 5.0 TI LOCK T25 STARDRIVE 40MM 04.005.530S: Type: IMPLANTABLE DEVICE | Site: LEG | Status: FUNCTIONAL

## 2020-07-08 RX ORDER — PROPOFOL 10 MG/ML
INJECTION, EMULSION INTRAVENOUS PRN
Status: DISCONTINUED | OUTPATIENT
Start: 2020-07-08 | End: 2020-07-08

## 2020-07-08 RX ORDER — ALBUTEROL SULFATE 90 UG/1
2 AEROSOL, METERED RESPIRATORY (INHALATION) EVERY 4 HOURS PRN
Status: DISCONTINUED | OUTPATIENT
Start: 2020-07-08 | End: 2020-07-09 | Stop reason: HOSPADM

## 2020-07-08 RX ORDER — KETOROLAC TROMETHAMINE 30 MG/ML
30 INJECTION, SOLUTION INTRAMUSCULAR; INTRAVENOUS EVERY 6 HOURS PRN
Status: DISCONTINUED | OUTPATIENT
Start: 2020-07-08 | End: 2020-07-08 | Stop reason: HOSPADM

## 2020-07-08 RX ORDER — DEXAMETHASONE SODIUM PHOSPHATE 4 MG/ML
4 INJECTION, SOLUTION INTRA-ARTICULAR; INTRALESIONAL; INTRAMUSCULAR; INTRAVENOUS; SOFT TISSUE EVERY 10 MIN PRN
Status: DISCONTINUED | OUTPATIENT
Start: 2020-07-08 | End: 2020-07-08 | Stop reason: HOSPADM

## 2020-07-08 RX ORDER — ALBUTEROL SULFATE 90 UG/1
AEROSOL, METERED RESPIRATORY (INHALATION) PRN
Status: DISCONTINUED | OUTPATIENT
Start: 2020-07-08 | End: 2020-07-08

## 2020-07-08 RX ORDER — DIMENHYDRINATE 50 MG/ML
25 INJECTION, SOLUTION INTRAMUSCULAR; INTRAVENOUS
Status: DISCONTINUED | OUTPATIENT
Start: 2020-07-08 | End: 2020-07-08 | Stop reason: HOSPADM

## 2020-07-08 RX ORDER — OXYCODONE HYDROCHLORIDE 5 MG/1
5-10 TABLET ORAL
Status: DISCONTINUED | OUTPATIENT
Start: 2020-07-08 | End: 2020-07-09 | Stop reason: HOSPADM

## 2020-07-08 RX ORDER — METOCLOPRAMIDE HYDROCHLORIDE 5 MG/ML
10 INJECTION INTRAMUSCULAR; INTRAVENOUS EVERY 6 HOURS PRN
Status: DISCONTINUED | OUTPATIENT
Start: 2020-07-08 | End: 2020-07-08 | Stop reason: HOSPADM

## 2020-07-08 RX ORDER — HYDROMORPHONE HYDROCHLORIDE 1 MG/ML
.3-.5 INJECTION, SOLUTION INTRAMUSCULAR; INTRAVENOUS; SUBCUTANEOUS
Status: DISCONTINUED | OUTPATIENT
Start: 2020-07-08 | End: 2020-07-09 | Stop reason: HOSPADM

## 2020-07-08 RX ORDER — METOPROLOL TARTRATE 1 MG/ML
1-2 INJECTION, SOLUTION INTRAVENOUS EVERY 5 MIN PRN
Status: DISCONTINUED | OUTPATIENT
Start: 2020-07-08 | End: 2020-07-08 | Stop reason: HOSPADM

## 2020-07-08 RX ORDER — DEXAMETHASONE SODIUM PHOSPHATE 4 MG/ML
INJECTION, SOLUTION INTRA-ARTICULAR; INTRALESIONAL; INTRAMUSCULAR; INTRAVENOUS; SOFT TISSUE PRN
Status: DISCONTINUED | OUTPATIENT
Start: 2020-07-08 | End: 2020-07-08

## 2020-07-08 RX ORDER — KETAMINE HYDROCHLORIDE 10 MG/ML
INJECTION INTRAMUSCULAR; INTRAVENOUS PRN
Status: DISCONTINUED | OUTPATIENT
Start: 2020-07-08 | End: 2020-07-08

## 2020-07-08 RX ORDER — MAGNESIUM HYDROXIDE 1200 MG/15ML
LIQUID ORAL PRN
Status: DISCONTINUED | OUTPATIENT
Start: 2020-07-08 | End: 2020-07-08 | Stop reason: HOSPADM

## 2020-07-08 RX ORDER — LIDOCAINE HYDROCHLORIDE 10 MG/ML
INJECTION, SOLUTION INFILTRATION; PERINEURAL PRN
Status: DISCONTINUED | OUTPATIENT
Start: 2020-07-08 | End: 2020-07-08

## 2020-07-08 RX ORDER — LIDOCAINE 40 MG/G
CREAM TOPICAL
Status: DISCONTINUED | OUTPATIENT
Start: 2020-07-08 | End: 2020-07-08 | Stop reason: HOSPADM

## 2020-07-08 RX ORDER — CEFAZOLIN SODIUM 1 G/50ML
1 INJECTION, SOLUTION INTRAVENOUS SEE ADMIN INSTRUCTIONS
Status: DISCONTINUED | OUTPATIENT
Start: 2020-07-08 | End: 2020-07-08 | Stop reason: HOSPADM

## 2020-07-08 RX ORDER — BUPIVACAINE HYDROCHLORIDE AND EPINEPHRINE 5; 5 MG/ML; UG/ML
INJECTION, SOLUTION EPIDURAL; INTRACAUDAL; PERINEURAL PRN
Status: DISCONTINUED | OUTPATIENT
Start: 2020-07-08 | End: 2020-07-08 | Stop reason: HOSPADM

## 2020-07-08 RX ORDER — NALOXONE HYDROCHLORIDE 0.4 MG/ML
.1-.4 INJECTION, SOLUTION INTRAMUSCULAR; INTRAVENOUS; SUBCUTANEOUS
Status: DISCONTINUED | OUTPATIENT
Start: 2020-07-08 | End: 2020-07-08 | Stop reason: HOSPADM

## 2020-07-08 RX ORDER — FENTANYL CITRATE 50 UG/ML
25-50 INJECTION, SOLUTION INTRAMUSCULAR; INTRAVENOUS
Status: DISCONTINUED | OUTPATIENT
Start: 2020-07-08 | End: 2020-07-08 | Stop reason: HOSPADM

## 2020-07-08 RX ORDER — FENTANYL CITRATE 50 UG/ML
INJECTION, SOLUTION INTRAMUSCULAR; INTRAVENOUS PRN
Status: DISCONTINUED | OUTPATIENT
Start: 2020-07-08 | End: 2020-07-08

## 2020-07-08 RX ORDER — SODIUM CHLORIDE 9 MG/ML
INJECTION, SOLUTION INTRAVENOUS CONTINUOUS
Status: DISCONTINUED | OUTPATIENT
Start: 2020-07-08 | End: 2020-07-09 | Stop reason: HOSPADM

## 2020-07-08 RX ORDER — HYDRALAZINE HYDROCHLORIDE 20 MG/ML
2.5-5 INJECTION INTRAMUSCULAR; INTRAVENOUS EVERY 10 MIN PRN
Status: DISCONTINUED | OUTPATIENT
Start: 2020-07-08 | End: 2020-07-08 | Stop reason: HOSPADM

## 2020-07-08 RX ORDER — LABETALOL 20 MG/4 ML (5 MG/ML) INTRAVENOUS SYRINGE
5 ONCE
Status: COMPLETED | OUTPATIENT
Start: 2020-07-08 | End: 2020-07-08

## 2020-07-08 RX ORDER — MEPERIDINE HYDROCHLORIDE 25 MG/ML
12.5 INJECTION INTRAMUSCULAR; INTRAVENOUS; SUBCUTANEOUS
Status: DISCONTINUED | OUTPATIENT
Start: 2020-07-08 | End: 2020-07-08 | Stop reason: HOSPADM

## 2020-07-08 RX ORDER — PROCHLORPERAZINE MALEATE 10 MG
10 TABLET ORAL EVERY 6 HOURS PRN
Status: DISCONTINUED | OUTPATIENT
Start: 2020-07-08 | End: 2020-07-09 | Stop reason: HOSPADM

## 2020-07-08 RX ORDER — ACETAMINOPHEN 325 MG/1
975 TABLET ORAL EVERY 8 HOURS
Status: DISCONTINUED | OUTPATIENT
Start: 2020-07-08 | End: 2020-07-09 | Stop reason: HOSPADM

## 2020-07-08 RX ORDER — ONDANSETRON 2 MG/ML
4 INJECTION INTRAMUSCULAR; INTRAVENOUS EVERY 30 MIN PRN
Status: DISCONTINUED | OUTPATIENT
Start: 2020-07-08 | End: 2020-07-08 | Stop reason: HOSPADM

## 2020-07-08 RX ORDER — CEFAZOLIN SODIUM 2 G/100ML
2 INJECTION, SOLUTION INTRAVENOUS EVERY 8 HOURS
Status: COMPLETED | OUTPATIENT
Start: 2020-07-08 | End: 2020-07-09

## 2020-07-08 RX ORDER — HYDROMORPHONE HYDROCHLORIDE 1 MG/ML
.3-.5 INJECTION, SOLUTION INTRAMUSCULAR; INTRAVENOUS; SUBCUTANEOUS EVERY 10 MIN PRN
Status: DISCONTINUED | OUTPATIENT
Start: 2020-07-08 | End: 2020-07-08 | Stop reason: HOSPADM

## 2020-07-08 RX ORDER — SODIUM CHLORIDE, SODIUM LACTATE, POTASSIUM CHLORIDE, CALCIUM CHLORIDE 600; 310; 30; 20 MG/100ML; MG/100ML; MG/100ML; MG/100ML
INJECTION, SOLUTION INTRAVENOUS CONTINUOUS
Status: DISCONTINUED | OUTPATIENT
Start: 2020-07-08 | End: 2020-07-08 | Stop reason: HOSPADM

## 2020-07-08 RX ORDER — CARBOXYMETHYLCELLULOSE SODIUM 5 MG/ML
1 SOLUTION/ DROPS OPHTHALMIC
Status: DISCONTINUED | OUTPATIENT
Start: 2020-07-08 | End: 2020-07-09 | Stop reason: HOSPADM

## 2020-07-08 RX ORDER — ACETAMINOPHEN 325 MG/1
650 TABLET ORAL EVERY 4 HOURS PRN
Status: DISCONTINUED | OUTPATIENT
Start: 2020-07-11 | End: 2020-07-09 | Stop reason: HOSPADM

## 2020-07-08 RX ORDER — EPHEDRINE SULFATE 50 MG/ML
INJECTION, SOLUTION INTRAMUSCULAR; INTRAVENOUS; SUBCUTANEOUS PRN
Status: DISCONTINUED | OUTPATIENT
Start: 2020-07-08 | End: 2020-07-08

## 2020-07-08 RX ORDER — ONDANSETRON 4 MG/1
4 TABLET, ORALLY DISINTEGRATING ORAL EVERY 30 MIN PRN
Status: DISCONTINUED | OUTPATIENT
Start: 2020-07-08 | End: 2020-07-08 | Stop reason: HOSPADM

## 2020-07-08 RX ORDER — LIDOCAINE 40 MG/G
CREAM TOPICAL
Status: DISCONTINUED | OUTPATIENT
Start: 2020-07-08 | End: 2020-07-09 | Stop reason: HOSPADM

## 2020-07-08 RX ORDER — METHYLPREDNISOLONE SODIUM SUCCINATE 125 MG/2ML
INJECTION, POWDER, LYOPHILIZED, FOR SOLUTION INTRAMUSCULAR; INTRAVENOUS PRN
Status: DISCONTINUED | OUTPATIENT
Start: 2020-07-08 | End: 2020-07-08

## 2020-07-08 RX ORDER — CEFAZOLIN SODIUM 2 G/100ML
2 INJECTION, SOLUTION INTRAVENOUS
Status: COMPLETED | OUTPATIENT
Start: 2020-07-08 | End: 2020-07-08

## 2020-07-08 RX ORDER — AMOXICILLIN 250 MG
1 CAPSULE ORAL 2 TIMES DAILY
Status: DISCONTINUED | OUTPATIENT
Start: 2020-07-08 | End: 2020-07-09 | Stop reason: HOSPADM

## 2020-07-08 RX ORDER — ONDANSETRON 2 MG/ML
4 INJECTION INTRAMUSCULAR; INTRAVENOUS EVERY 6 HOURS PRN
Status: DISCONTINUED | OUTPATIENT
Start: 2020-07-08 | End: 2020-07-09 | Stop reason: HOSPADM

## 2020-07-08 RX ORDER — METOCLOPRAMIDE 10 MG/1
10 TABLET ORAL EVERY 6 HOURS PRN
Status: DISCONTINUED | OUTPATIENT
Start: 2020-07-08 | End: 2020-07-08 | Stop reason: HOSPADM

## 2020-07-08 RX ORDER — GLYCOPYRROLATE 0.2 MG/ML
INJECTION, SOLUTION INTRAMUSCULAR; INTRAVENOUS PRN
Status: DISCONTINUED | OUTPATIENT
Start: 2020-07-08 | End: 2020-07-08

## 2020-07-08 RX ORDER — NALOXONE HYDROCHLORIDE 0.4 MG/ML
.1-.4 INJECTION, SOLUTION INTRAMUSCULAR; INTRAVENOUS; SUBCUTANEOUS
Status: DISCONTINUED | OUTPATIENT
Start: 2020-07-08 | End: 2020-07-09 | Stop reason: HOSPADM

## 2020-07-08 RX ORDER — AMOXICILLIN 250 MG
2 CAPSULE ORAL 2 TIMES DAILY
Status: DISCONTINUED | OUTPATIENT
Start: 2020-07-08 | End: 2020-07-09 | Stop reason: HOSPADM

## 2020-07-08 RX ORDER — ONDANSETRON 4 MG/1
4 TABLET, ORALLY DISINTEGRATING ORAL EVERY 6 HOURS PRN
Status: DISCONTINUED | OUTPATIENT
Start: 2020-07-08 | End: 2020-07-09 | Stop reason: HOSPADM

## 2020-07-08 RX ADMIN — ALBUTEROL SULFATE 6 PUFF: 90 AEROSOL, METERED RESPIRATORY (INHALATION) at 10:19

## 2020-07-08 RX ADMIN — FENTANYL CITRATE 50 MCG: 50 INJECTION, SOLUTION INTRAMUSCULAR; INTRAVENOUS at 08:15

## 2020-07-08 RX ADMIN — CEFAZOLIN SODIUM 2 G: 2 INJECTION, SOLUTION INTRAVENOUS at 16:43

## 2020-07-08 RX ADMIN — PROPOFOL 40 MG: 10 INJECTION, EMULSION INTRAVENOUS at 09:13

## 2020-07-08 RX ADMIN — ALBUTEROL SULFATE 6 PUFF: 90 AEROSOL, METERED RESPIRATORY (INHALATION) at 08:21

## 2020-07-08 RX ADMIN — PHENYLEPHRINE HYDROCHLORIDE 100 MCG: 10 INJECTION INTRAVENOUS at 10:30

## 2020-07-08 RX ADMIN — SODIUM CHLORIDE: 9 INJECTION, SOLUTION INTRAVENOUS at 01:32

## 2020-07-08 RX ADMIN — ACETAMINOPHEN 975 MG: 325 TABLET, FILM COATED ORAL at 14:52

## 2020-07-08 RX ADMIN — FENTANYL CITRATE 50 MCG: 50 INJECTION, SOLUTION INTRAMUSCULAR; INTRAVENOUS at 10:24

## 2020-07-08 RX ADMIN — FENTANYL CITRATE 50 MCG: 50 INJECTION, SOLUTION INTRAMUSCULAR; INTRAVENOUS at 08:23

## 2020-07-08 RX ADMIN — Medication 10 MG: at 08:33

## 2020-07-08 RX ADMIN — SODIUM CHLORIDE, POTASSIUM CHLORIDE, SODIUM LACTATE AND CALCIUM CHLORIDE: 600; 310; 30; 20 INJECTION, SOLUTION INTRAVENOUS at 10:28

## 2020-07-08 RX ADMIN — OXYCODONE HYDROCHLORIDE 5 MG: 5 TABLET ORAL at 02:49

## 2020-07-08 RX ADMIN — DEXAMETHASONE SODIUM PHOSPHATE 4 MG: 4 INJECTION, SOLUTION INTRA-ARTICULAR; INTRALESIONAL; INTRAMUSCULAR; INTRAVENOUS; SOFT TISSUE at 09:33

## 2020-07-08 RX ADMIN — ALBUTEROL SULFATE 6 PUFF: 90 AEROSOL, METERED RESPIRATORY (INHALATION) at 09:13

## 2020-07-08 RX ADMIN — HYDRALAZINE HYDROCHLORIDE 5 MG: 20 INJECTION INTRAMUSCULAR; INTRAVENOUS at 12:43

## 2020-07-08 RX ADMIN — LIDOCAINE HYDROCHLORIDE 50 MG: 10 INJECTION, SOLUTION INFILTRATION; PERINEURAL at 07:56

## 2020-07-08 RX ADMIN — PROPOFOL 30 MG: 10 INJECTION, EMULSION INTRAVENOUS at 10:00

## 2020-07-08 RX ADMIN — CEFAZOLIN SODIUM 2 G: 2 INJECTION, SOLUTION INTRAVENOUS at 07:59

## 2020-07-08 RX ADMIN — PHENYLEPHRINE HYDROCHLORIDE 100 MCG: 10 INJECTION INTRAVENOUS at 08:37

## 2020-07-08 RX ADMIN — FENTANYL CITRATE 50 MCG: 50 INJECTION, SOLUTION INTRAMUSCULAR; INTRAVENOUS at 11:18

## 2020-07-08 RX ADMIN — PROPOFOL 200 MG: 10 INJECTION, EMULSION INTRAVENOUS at 07:56

## 2020-07-08 RX ADMIN — PHENYLEPHRINE HYDROCHLORIDE 100 MCG: 10 INJECTION INTRAVENOUS at 08:21

## 2020-07-08 RX ADMIN — FENTANYL CITRATE 50 MCG: 50 INJECTION, SOLUTION INTRAMUSCULAR; INTRAVENOUS at 08:12

## 2020-07-08 RX ADMIN — PROPOFOL 30 MG: 10 INJECTION, EMULSION INTRAVENOUS at 10:13

## 2020-07-08 RX ADMIN — PHENYLEPHRINE HYDROCHLORIDE 200 MCG: 10 INJECTION INTRAVENOUS at 10:16

## 2020-07-08 RX ADMIN — GLYCOPYRROLATE 0.2 MG: 0.2 INJECTION, SOLUTION INTRAMUSCULAR; INTRAVENOUS at 07:58

## 2020-07-08 RX ADMIN — HYDROMORPHONE HYDROCHLORIDE 0.5 MG: 1 INJECTION, SOLUTION INTRAMUSCULAR; INTRAVENOUS; SUBCUTANEOUS at 14:41

## 2020-07-08 RX ADMIN — FENTANYL CITRATE 50 MCG: 50 INJECTION, SOLUTION INTRAMUSCULAR; INTRAVENOUS at 09:11

## 2020-07-08 RX ADMIN — FENTANYL CITRATE 50 MCG: 50 INJECTION, SOLUTION INTRAMUSCULAR; INTRAVENOUS at 11:52

## 2020-07-08 RX ADMIN — OXYCODONE HYDROCHLORIDE 5 MG: 5 TABLET ORAL at 19:35

## 2020-07-08 RX ADMIN — PHENYLEPHRINE HYDROCHLORIDE 100 MCG: 10 INJECTION INTRAVENOUS at 10:34

## 2020-07-08 RX ADMIN — ASPIRIN 325 MG: 325 TABLET, COATED ORAL at 14:52

## 2020-07-08 RX ADMIN — FENTANYL CITRATE 100 MCG: 50 INJECTION, SOLUTION INTRAMUSCULAR; INTRAVENOUS at 07:56

## 2020-07-08 RX ADMIN — FLUTICASONE FUROATE AND VILANTEROL TRIFENATATE 1 PUFF: 200; 25 POWDER RESPIRATORY (INHALATION) at 17:16

## 2020-07-08 RX ADMIN — SODIUM CHLORIDE: 9 INJECTION, SOLUTION INTRAVENOUS at 14:41

## 2020-07-08 RX ADMIN — PHENYLEPHRINE HYDROCHLORIDE 100 MCG: 10 INJECTION INTRAVENOUS at 08:09

## 2020-07-08 RX ADMIN — PHENYLEPHRINE HYDROCHLORIDE 100 MCG: 10 INJECTION INTRAVENOUS at 08:49

## 2020-07-08 RX ADMIN — LABETALOL 20 MG/4 ML (5 MG/ML) INTRAVENOUS SYRINGE 5 MG: at 13:18

## 2020-07-08 RX ADMIN — Medication 50 MG: at 08:01

## 2020-07-08 RX ADMIN — Medication 10 MG: at 08:28

## 2020-07-08 RX ADMIN — HYDROMORPHONE HYDROCHLORIDE 1 MG: 1 INJECTION, SOLUTION INTRAMUSCULAR; INTRAVENOUS; SUBCUTANEOUS at 08:01

## 2020-07-08 RX ADMIN — SODIUM CHLORIDE, POTASSIUM CHLORIDE, SODIUM LACTATE AND CALCIUM CHLORIDE: 600; 310; 30; 20 INJECTION, SOLUTION INTRAVENOUS at 07:48

## 2020-07-08 RX ADMIN — PROPOFOL 30 MG: 10 INJECTION, EMULSION INTRAVENOUS at 08:22

## 2020-07-08 RX ADMIN — METOPROLOL TARTRATE 2 MG: 5 INJECTION INTRAVENOUS at 12:53

## 2020-07-08 RX ADMIN — DEXAMETHASONE SODIUM PHOSPHATE 4 MG: 4 INJECTION, SOLUTION INTRA-ARTICULAR; INTRALESIONAL; INTRAMUSCULAR; INTRAVENOUS; SOFT TISSUE at 07:56

## 2020-07-08 RX ADMIN — DOCUSATE SODIUM AND SENNOSIDES 2 TABLET: 8.6; 5 TABLET, FILM COATED ORAL at 19:35

## 2020-07-08 RX ADMIN — OXYCODONE HYDROCHLORIDE 5 MG: 5 TABLET ORAL at 12:36

## 2020-07-08 RX ADMIN — ONDANSETRON 4 MG: 2 INJECTION INTRAMUSCULAR; INTRAVENOUS at 07:56

## 2020-07-08 RX ADMIN — OXYCODONE HYDROCHLORIDE 5 MG: 5 TABLET ORAL at 16:42

## 2020-07-08 RX ADMIN — METHYLPREDNISOLONE 125 MG: 125 INJECTION, POWDER, LYOPHILIZED, FOR SOLUTION INTRAMUSCULAR; INTRAVENOUS at 09:35

## 2020-07-08 RX ADMIN — MIDAZOLAM 2 MG: 1 INJECTION INTRAMUSCULAR; INTRAVENOUS at 07:50

## 2020-07-08 RX ADMIN — CEFAZOLIN SODIUM 1 G: 1 INJECTION, SOLUTION INTRAVENOUS at 10:02

## 2020-07-08 RX ADMIN — SODIUM CHLORIDE, POTASSIUM CHLORIDE, SODIUM LACTATE AND CALCIUM CHLORIDE: 600; 310; 30; 20 INJECTION, SOLUTION INTRAVENOUS at 08:13

## 2020-07-08 ASSESSMENT — ACTIVITIES OF DAILY LIVING (ADL)
ADLS_ACUITY_SCORE: 18
ADLS_ACUITY_SCORE: 20

## 2020-07-08 NOTE — PLAN OF CARE
Pt A&Ox4. Reported 3/10 pain. Pain control with oxycodone. Dressing with scant drainage. Flat foot weight bearing on left with crutches. Leg elevated, ice applied. Voiding. Regular diet. VSS

## 2020-07-08 NOTE — ANESTHESIA POSTPROCEDURE EVALUATION
Patient: Damián BARONE Marisabel    Procedure(s):  OPEN REDUCTION INTERNAL FIXATION, FRACTURE, TIBIA, USING INTRAMEDULLARY KAHLIL    Diagnosis:Closed fracture of distal tibia [S82.309A]  Diagnosis Additional Information: No value filed.    Anesthesia Type:  General    Note:  Anesthesia Post Evaluation    Patient location during evaluation: PACU  Patient participation: Able to fully participate in evaluation  Level of consciousness: awake and alert  Pain management: adequate  Airway patency: patent  Cardiovascular status: acceptable  Respiratory status: acceptable  Hydration status: acceptable  PONV: controlled     Anesthetic complications: None          Last vitals:  Vitals:    07/08/20 1441 07/08/20 1500 07/08/20 1528   BP:  (!) 148/97 (!) 158/94   Pulse:      Resp: 16 18 8   Temp:   97.1  F (36.2  C)   SpO2: 96% 95%          Electronically Signed By: Damián Collins MD  July 8, 2020  3:36 PM

## 2020-07-08 NOTE — PLAN OF CARE
PT is A&Ox4. On room air. Pain managed with PO Oxycodone and Tylenol. Some tingling to LLE. LLE elevated and Q2 hour neuro checks completed. 2+ pulses in BLEs and capillary refill under 3 seconds. Toes in LLE cool. Dressing to leg CDI. PT on bedrest with bathroom privileges. Repositioning independently in bed. Voiding spontaneously with urinal. Tolerating a regular diet.  Plan for surgery at 0730 am tomorrow with Dr Degroot. PT will be NPO at midnight. Bed bath completed. Plan of care reviewed with patient.

## 2020-07-08 NOTE — ANESTHESIA CARE TRANSFER NOTE
Patient: Damián Petit    Procedure(s):  OPEN REDUCTION INTERNAL FIXATION, FRACTURE, TIBIA, USING INTRAMEDULLARY KAHLIL    Diagnosis: Closed fracture of distal tibia [S82.309A]  Diagnosis Additional Information: No value filed.    Anesthesia Type:   General     Note:  Airway :Face Mask and Oral Airway  Patient transferred to:PACU  Comments: Pt transferred to PACU; VSS; Report to RNHandoff Report: Identifed the Patient, Identified the Reponsible Provider, Reviewed the pertinent medical history, Discussed the surgical course, Reviewed Intra-OP anesthesia mangement and issues during anesthesia, Set expectations for post-procedure period and Allowed opportunity for questions and acknowledgement of understanding      Vitals: (Last set prior to Anesthesia Care Transfer)    CRNA VITALS  7/8/2020 1012 - 7/8/2020 1048      7/8/2020             Pulse:  94    SpO2:  93 %                Electronically Signed By: MARIEL Zhong CRNA  July 8, 2020  10:48 AM

## 2020-07-08 NOTE — PROGRESS NOTES
RT: Received patient on 6L oxymask, SPO2 92%. Snoring respirations noted, RR 24-28.    Patient placed on BIPAP 14/7, 12, 50%. Adequate tidal volumes noted. RR 22-24, SPO2 95%. BS coarse t/o. Will continue to follow and assess.

## 2020-07-08 NOTE — ANESTHESIA PREPROCEDURE EVALUATION
Anesthesia Pre-Procedure Evaluation    Patient: Damián Petit   MRN: [id number] : 1980          Preoperative Diagnosis: Closed fracture of distal tibia [S82.309A]    Procedure(s):  OPEN REDUCTION INTERNAL FIXATION, FRACTURE, TIBIA, USING INTRAMEDULLARY KAHLIL    History reviewed. No pertinent past medical history.  Past Surgical History:   Procedure Laterality Date     ENT SURGERY      Tonsils and adnoids as a child     ORTHOPEDIC SURGERY       Anesthesia Evaluation     . Pt has had prior anesthetic. Type: General           ROS/MED HX    ENT/Pulmonary:  - neg pulmonary ROS     Neurologic:  - neg neurologic ROS     Cardiovascular:  - neg cardiovascular ROS       METS/Exercise Tolerance:     Hematologic:  - neg hematologic  ROS       Musculoskeletal:   (+) fracture lower extremity: Other (Comment), -       GI/Hepatic:  - neg GI/hepatic ROS       Renal/Genitourinary:  - ROS Renal section negative       Endo:  - neg endo ROS       Psychiatric:  - neg psychiatric ROS       Infectious Disease:  - neg infectious disease ROS       Malignancy:      - no malignancy   Other:    (+) No chance of pregnancy C-spine cleared: N/A, no H/O Chronic Pain,no other significant disability   - neg other ROS                      Physical Exam  Normal systems: cardiovascular, pulmonary and dental    Airway   Mallampati: II  TM distance: >3 FB  Neck ROM: full    Dental     Cardiovascular       Pulmonary             Lab Results   Component Value Date    WBC 18.3 (H) 2020    HGB 15.5 2020    HCT 46.8 2020     2020     2020    POTASSIUM 3.5 2020    CHLORIDE 107 2020    CO2 27 2020    BUN 16 2020    CR 1.22 2020     (H) 2020    USMAN 8.6 2020       Preop Vitals  BP Readings from Last 3 Encounters:   20 (!) 145/101    Pulse Readings from Last 3 Encounters:   20 80      Resp Readings from Last 3 Encounters:   20 18    SpO2 Readings  "from Last 3 Encounters:   07/08/20 98%      Temp Readings from Last 1 Encounters:   07/08/20 98.5  F (36.9  C) (Temporal)    Ht Readings from Last 1 Encounters:   07/07/20 1.753 m (5' 9\")      Wt Readings from Last 1 Encounters:   07/07/20 98.2 kg (216 lb 9.6 oz)    Estimated body mass index is 31.99 kg/m  as calculated from the following:    Height as of this encounter: 1.753 m (5' 9\").    Weight as of this encounter: 98.2 kg (216 lb 9.6 oz).       Anesthesia Plan      History & Physical Review  History and physical reviewed and following examination; no interval change.    ASA Status:  1 .    NPO Status:  > 8 hours    Plan for General with Propofol and Intravenous induction. Maintenance will be Balanced.    PONV prophylaxis:  Ondansetron (or other 5HT-3) and Dexamethasone or Solumedrol         Postoperative Care  Postoperative pain management:  IV analgesics.      Consents  Anesthetic plan, risks, benefits and alternatives discussed with:  Patient.  Use of blood products discussed: Yes.   Use of blood products discussed with Patient.  Consented to blood products.  .                 Damián oCllins MD                    .  "

## 2020-07-08 NOTE — CONSULTS
[facility]    Orthopedic Consultation    Damián Petit MRN# [id number]   Age: 39 year old YOB: 1980     Date of Admission:  7/7/2020    Reason for consult: Left closed Tib/fib fracture       Requesting physician: Raven Hillman MD       Level of consult: Consult, follow and place orders           Assessment and Plan:   Assessment:   Left, closed, midshaft Tibia / Fibula Fracture  No evidence for compartment syndrome      Plan:   Recommend surgery:   Surgery to include IM Rodding left tibia.  We discussed the nature of surgery including the Risks, Benefits, and alternatives.  These include but are not inclusive of: Wound healing problems, infection, potential injury to neurovascular structures and subsequent dysfunction, transfusion, failure of the hardware, and potential need for future surgery.  We discussed post operative expectations and long term expectations. The patient understands and wishes to proceed.               Chief Complaint:   Left Tibia Fibula Fracture         History of Present Illness:   Damián Petit is a 39 year old male who presents to the ED via EMS for evaluation of leg pain after a fall. The patient reports that he fell off a seven foot ladder and landed on his left foot earlier this morning. He experienced the immediate onset of leg pain and noticed a deformity to his lower left leg, prompting him to call EMS. EMS did apply a splint at the scene and administered 100 mcg fentanyl IV prior to arrival. The patient rates his pain as a 4/10. He denies that he hit his head or lost consciousness, and he denies any other pains including abdominal pain, back pain, or headache. He is not anticoagulated.           Past Medical History:   History reviewed. No pertinent past medical history.          Past Surgical History:     Past Surgical History:   Procedure Laterality Date     ENT SURGERY      Tonsils and adnoids as a child     ORTHOPEDIC SURGERY                Social History:     Social History     Tobacco Use     Smoking status: Never Smoker     Smokeless tobacco: Never Used   Substance Use Topics     Alcohol use: Yes     Comment: occasionally             Family History:   History reviewed. No pertinent family history.          Immunizations:     VACCINE/DOSE   Diptheria   DPT   DTAP   HBIG   Hepatitis A   Hepatitis B   HIB   Influenza   Measles   Meningococcal   MMR   Mumps   Pneumococcal   Polio   Rubella   Small Pox   TDAP   Varicella   Zoster             Allergies:   No Known Allergies          Medications:     Current Facility-Administered Medications   Medication     [Auto Hold] acetaminophen (TYLENOL) tablet 650 mg     bupivacaine 0.5% - EPINEPHrine 1:200,000 (PF) injection     [Auto Hold] HYDROmorphone (PF) (DILAUDID) injection 0.3-0.5 mg     [Auto Hold] ibuprofen (ADVIL/MOTRIN) tablet 600 mg     [Auto Hold] lidocaine (LMX4) cream     [Auto Hold] lidocaine 1 % 0.1-1 mL     [Auto Hold] magnesium hydroxide (MILK OF MAGNESIA) suspension 30 mL     [Auto Hold] melatonin tablet 1 mg     [Auto Hold] naloxone (NARCAN) injection 0.1-0.4 mg     [Auto Hold] ondansetron (ZOFRAN-ODT) ODT tab 4 mg    Or     [Auto Hold] ondansetron (ZOFRAN) injection 4 mg     [Auto Hold] oxyCODONE (ROXICODONE) tablet 5-10 mg     [Auto Hold] potassium chloride (KLOR-CON) Packet 20-40 mEq     [Auto Hold] potassium chloride 10 mEq in 100 mL intermittent infusion with 10 mg lidocaine     [Auto Hold] potassium chloride 10 mEq in 100 mL sterile water intermittent infusion (premix)     [Auto Hold] potassium chloride 20 mEq in 50 mL intermittent infusion     [Auto Hold] potassium chloride ER (KLOR-CON M) CR tablet 20-40 mEq     [Auto Hold] prochlorperazine (COMPAZINE) injection 10 mg    Or     [Auto Hold] prochlorperazine (COMPAZINE) tablet 10 mg    Or     [Auto Hold] prochlorperazine (COMPAZINE) Suppository 25 mg     [Auto Hold] senna-docusate (SENOKOT-S/PERICOLACE) 8.6-50 MG per tablet 1 tablet    Or  "    [Auto Hold] senna-docusate (SENOKOT-S/PERICOLACE) 8.6-50 MG per tablet 2 tablet     [Auto Hold] sodium chloride (PF) 0.9% PF flush 3 mL     [Auto Hold] sodium chloride (PF) 0.9% PF flush 3 mL     sodium chloride 0.9% (bottle) irrigation     sodium chloride 0.9% infusion             Review of Systems:    ROS: 10 point ROS neg other than the symptoms noted above in the HPI.            Physical Exam:   All vitals have been reviewed  Patient Vitals for the past 24 hrs:   BP Temp Temp src Pulse Resp SpO2 Height Weight   07/08/20 0617 (!) 145/101 98.5  F (36.9  C) Temporal 80 18 98 % -- --   07/07/20 2343 (!) 159/96 99.1  F (37.3  C) Oral 82 20 97 % -- --   07/07/20 2034 (!) 148/95 97  F (36.1  C) Oral 90 18 96 % -- --   07/07/20 1419 128/86 96.2  F (35.7  C) Oral 77 20 98 % 1.753 m (5' 9\") 98.2 kg (216 lb 9.6 oz)   07/07/20 1320 (!) 152/89 -- -- -- -- 98 % -- --   07/07/20 1210 136/85 -- -- -- -- 97 % -- --   07/07/20 1200 134/81 -- -- 70 -- 97 % -- --   07/07/20 1150 135/80 -- -- -- -- 96 % -- --   07/07/20 1130 135/75 -- -- 70 -- 98 % -- --   07/07/20 1115 128/79 -- -- 66 -- 96 % -- --   07/07/20 1114 -- -- -- -- -- 96 % -- --   07/07/20 1105 -- -- -- -- -- 98 % -- --   07/07/20 1104 -- -- -- -- -- 96 % -- --   07/07/20 1100 107/67 -- -- 64 -- 97 % -- --   07/07/20 1050 106/66 98  F (36.7  C) Oral 66 18 97 % -- --       Intake/Output Summary (Last 24 hours) at 7/8/2020 1049  Last data filed at 7/8/2020 1028  Gross per 24 hour   Intake 2903 ml   Output 1410 ml   Net 1493 ml     CRAN: NCAT  PER  NECK: Supple NT  Respiratory effort appears normal  Left Leg:      No gross deformity.  Mild bruising of anterior tibial region at level of fracture      No other trophic skin changes, no erythema, no lymphangitis      Wiggles toes, foot freely with minimal discomfort      Palp DP pulses      Good sensation to LT in deep and superficial peroneal, saphenous, sural, Tibial nerve distributions          Data:   All laboratory " data reviewed  Results for orders placed or performed during the hospital encounter of 07/07/20   XR Tibia & Fibula Left 2 Views     Status: None    Narrative    LEFT ANKLE TWO VIEWS;  LEFT TIBIA AND FIBULA TWO VIEWS  7/7/2020 11:31 AM     HISTORY: Left ankle pain, left tibia pain; fall.      Impression    IMPRESSION: Left tibial distal diaphyseal oblique or spiral fracture  with approximately one shaft width lateral displacement of the distal  fragment and approximately 1.5 cm overriding of the fracture  fragments. Adjacent fibular comminuted fracture is also noted. No  evidence of acute ankle fracture. The ankle mortise appears congruent.  At the knee, ACL reconstruction interference screws are noted.    AKUA FREEMAN MD   XR Ankle Left 2 Views     Status: None    Narrative    LEFT ANKLE TWO VIEWS;  LEFT TIBIA AND FIBULA TWO VIEWS  7/7/2020 11:31 AM     HISTORY: Left ankle pain, left tibia pain; fall.      Impression    IMPRESSION: Left tibial distal diaphyseal oblique or spiral fracture  with approximately one shaft width lateral displacement of the distal  fragment and approximately 1.5 cm overriding of the fracture  fragments. Adjacent fibular comminuted fracture is also noted. No  evidence of acute ankle fracture. The ankle mortise appears congruent.  At the knee, ACL reconstruction interference screws are noted.    AKUA FREEMAN MD   CBC with platelets differential     Status: Abnormal   Result Value Ref Range    WBC 11.9 (H) 4.0 - 11.0 10e9/L    RBC Count 5.43 4.4 - 5.9 10e12/L    Hemoglobin 15.1 13.3 - 17.7 g/dL    Hematocrit 46.6 40.0 - 53.0 %    MCV 86 78 - 100 fl    MCH 27.8 26.5 - 33.0 pg    MCHC 32.4 31.5 - 36.5 g/dL    RDW 12.8 10.0 - 15.0 %    Platelet Count 375 150 - 450 10e9/L    Diff Method Manual Differential     % Neutrophils 58.0 %    % Lymphocytes 30.0 %    % Monocytes 6.0 %    % Eosinophils 5.0 %    % Basophils 0.0 %    % Metamyelocytes 1.0 %    Absolute Neutrophil 6.9 1.6 - 8.3 10e9/L     Absolute Lymphocytes 3.6 0.8 - 5.3 10e9/L    Absolute Monocytes 0.7 0.0 - 1.3 10e9/L    Absolute Eosinophils 0.6 0.0 - 0.7 10e9/L    Absolute Basophils 0.0 0.0 - 0.2 10e9/L    Absolute Metamyelocytes 0.1 (H) 0 10e9/L    Reactive Lymphs Present     RBC Morphology Consistent with reported results     Platelet Estimate       Automated count confirmed.  Giant platelets are present.   Basic metabolic panel     Status: Abnormal   Result Value Ref Range    Sodium 140 133 - 144 mmol/L    Potassium 3.0 (L) 3.4 - 5.3 mmol/L    Chloride 107 94 - 109 mmol/L    Carbon Dioxide 27 20 - 32 mmol/L    Anion Gap 6 3 - 14 mmol/L    Glucose 146 (H) 70 - 99 mg/dL    Urea Nitrogen 16 7 - 30 mg/dL    Creatinine 1.22 0.66 - 1.25 mg/dL    GFR Estimate 74 >60 mL/min/[1.73_m2]    GFR Estimate If Black 86 >60 mL/min/[1.73_m2]    Calcium 8.6 8.5 - 10.1 mg/dL   Alcohol level blood     Status: None   Result Value Ref Range    Ethanol g/dL <0.01 <0.01 g/dL   CBC with platelets     Status: Abnormal   Result Value Ref Range    WBC 18.3 (H) 4.0 - 11.0 10e9/L    RBC Count 5.48 4.4 - 5.9 10e12/L    Hemoglobin 15.5 13.3 - 17.7 g/dL    Hematocrit 46.8 40.0 - 53.0 %    MCV 85 78 - 100 fl    MCH 28.3 26.5 - 33.0 pg    MCHC 33.1 31.5 - 36.5 g/dL    RDW 12.8 10.0 - 15.0 %    Platelet Count 282 150 - 450 10e9/L   Asymptomatic COVID-19 Virus (Coronavirus) by PCR     Status: None    Specimen: Nasopharyngeal   Result Value Ref Range    COVID-19 Virus PCR to [facility] - Source Nasopharyngeal     COVID-19 Virus PCR to [facility] - Result       Test received-See reflex to IDDL test SARS CoV2 (COVID-19) Virus RT-PCR   SARS-CoV-2 COVID-19 Virus (Coronavirus) RT-PCR Nasopharyngeal     Status: None    Specimen: Nasopharyngeal   Result Value Ref Range    SARS-CoV-2 Virus Specimen Source Nasopharyngeal     SARS-CoV-2 PCR Result NEGATIVE     SARS-CoV-2 PCR Comment       Testing was performed using the Xpert Xpress SARS-CoV-2 Assay on the Cepheid Gene-Xpert   Instrument Systems.  Additional information about this Emergency Use Authorization (EUA)   assay can be found via the Lab Guide.     Potassium     Status: None   Result Value Ref Range    Potassium 3.5 3.4 - 5.3 mmol/L   Potassium     Status: None   Result Value Ref Range    Potassium 3.7 3.4 - 5.3 mmol/L   ABO/Rh type and screen     Status: None   Result Value Ref Range    ABO O     RH(D) Pos     Antibody Screen Neg     Test Valid Only At [facility]        Specimen Expires 07/10/2020           Attestation:  I have reviewed today's vital signs, notes, medications, labs and imaging.  Amount of time performed on this consult: 25 minutes.    Ramakrishna Degroot MD

## 2020-07-08 NOTE — OP NOTE
SURGERY   Ramakrishna Degroot MD     OPERATIVE NOTE    Pre-operative diagnosis: Tibia fracture [S82.209A]; Left     Post-operative diagnosis Tibia fracture [S82.209A]; Left     Procedure:      Indication:  Procedure(s):  Left TIBIAL INTRAMEDULLARY RODDING.    Acute Trauma, Displaced Tibial Shaft Fracture; Left     Surgeon: Ramakrishna Degroot MD     Assistants(s):  Carrie Steinberg , PAC - Assist     Anesthesia: General      Estimated blood loss: Less than 100 ml     Total IV fluids: (See anesthesia record)     Blood transfusion: (See anesthesia record)     Total urine output: (See anesthesia record)     Drains: None     Specimens: * No specimens in log *     Findings: None     Complications: None     Implants:   Synthes: 10 x 345 IM Nail; 4  Inter locking screws; 5mm cap         HPI:   38 yo male fall from ladder.  Sustained a closed midshaft Tib/fib fracture.  We discussed treatment options.  I recommended surgery. Surgery to include: IM rodding left Tibia.  We discussed the nature of surgery including the Risks, Benefits, and alternatives.  These include but are not inclusive of: Wound healing problems, infection, potential injury to neurovascular structures and subsequent dysfunction, transfusion, failure of the hardware, and potential need for future surgery.  We discussed post operative expectations and long term expectations. The patient understands and wishes to proceed.      Operative Procedure:    The patient was brought to the operating room.  The patient was carefully transferred to the radiolucent  operating table and adequate General anesthesia was obtained. The patient was carefully placed in the supine position. The Left  leg was prepped and draped in the usual sterile fashion.  Appropriate antibiotic was given to the patient preoperatively within one hour of the procedure.  Preoperative time out was perfromed confirming the Left  leg and appropriate procedure.  The Surgeons jennifer was noted.   Mr(s)'s  Carrie Steinberg PAC - Assist's  services were necessary during the entirety of the case due to the complexity and nature of the procedure.      A midline incision was made beginning at the inferior 1/3 of the Patella and extending distally to the Tibial Tubercle.  A Medial parapatellar tendon approach  was used exposing sharply down to the Tibial Crest.  Superficial vessels and cutaneous nerves were spared as was possible.  A Guide pin was placed 8-10 cm into the Tibial Metaphysis beginning at the Tibial crest, in line with the axis of the medullary canal and with the lateral tubercle of the inter-condylar eminence on the AP flouro View and the ventral edge of the Tibial plateau on the lateral flouro view.   A 12 mm cannulated entrance drill was used to create the entrance site to the Tibia.  Next a Ball tipped guide jillian was placed down the Tibial shaft.  The Fracture was reduced with indirect reduction and traction.  The Guide wire was then placed down to the Epiphyseal scar in the distal Tibia. This was checked to be centered in AP and Lateral planes.  The Medullary canal was then reamed to a size 11 reamer.  The Guide pin was measured to 375 mm.     A 10 x 345 mm Tibial nail was then placed down the Tibia.  Further reduction of the Fracture was performed.  Two Med-Lat Interlocking screws were placed distally using the perfect [place] technique. The fracture was checked for reduction.  Then, two Proximal Med/Lat interlocking screws were placed with the proximal screw guide.  The Hardware and reduction were checked under flouro and found to be in good position.     The incisions were thoroughly irrigated with normal saline.  The proximal incision was closed with a running #1 Vicryl for the peritenon of the Patellar tendon and 2.0 Vicryl for the subcutaneous closure.  The skin was closed with Staples.    Steri strips were applied.  Staples were used to close the distal incisions.   Sterile dressings  were applied.  The leg was placed in a soft dressing.  The patient was transferred back to their hospital bed and to the PACU in stable condition.    Post Op Plan of Care:  Post Op Flat of foot weight baring for first 4 wks (TWB) 25-30 lbs.  Recheck in 10 days for incision check with new films.  Start ROM ankle immediately post op.  Follow up at 6 wks with new films to progress weight baring.        Ramakrishna Degroot MD  [facility]

## 2020-07-08 NOTE — PLAN OF CARE
End of Shift Summary  For vital signs and complete assessments, please see documentation flowsheets.     Pertinent assessments: CMS intact, tolerating pain  Major Shift Events Oxy q3 hours, prepped and sent to pre-op for procedure  Treatment Plan: pain control, plan for nailing today     Wife is visiting family member, wishes to be notified by PACU in post-op.

## 2020-07-08 NOTE — PROVIDER NOTIFICATION
Dr. Collins notified that patient's oxygen saturations 89-93 % on 7 lpm via simple mask.  Patient sedated from OR, RR 24-26 and lungs sounds course to upper lobes anteriorly.  MDA assessed patient at bedside and stated to call RT to place patient on bipap.

## 2020-07-09 ENCOUNTER — APPOINTMENT (OUTPATIENT)
Dept: PHYSICAL THERAPY | Facility: CLINIC | Age: 40
DRG: 494 | End: 2020-07-09
Attending: PHYSICIAN ASSISTANT
Payer: COMMERCIAL

## 2020-07-09 VITALS
WEIGHT: 216.6 LBS | HEIGHT: 69 IN | BODY MASS INDEX: 32.08 KG/M2 | DIASTOLIC BLOOD PRESSURE: 98 MMHG | TEMPERATURE: 96.6 F | OXYGEN SATURATION: 92 % | SYSTOLIC BLOOD PRESSURE: 154 MMHG | RESPIRATION RATE: 18 BRPM | HEART RATE: 93 BPM

## 2020-07-09 LAB — HGB BLD-MCNC: 12.8 G/DL (ref 13.3–17.7)

## 2020-07-09 PROCEDURE — 94640 AIRWAY INHALATION TREATMENT: CPT

## 2020-07-09 PROCEDURE — 97530 THERAPEUTIC ACTIVITIES: CPT | Mod: GP | Performed by: PHYSICAL THERAPIST

## 2020-07-09 PROCEDURE — 97116 GAIT TRAINING THERAPY: CPT | Mod: GP | Performed by: PHYSICAL THERAPIST

## 2020-07-09 PROCEDURE — 25800030 ZZH RX IP 258 OP 636: Performed by: PHYSICIAN ASSISTANT

## 2020-07-09 PROCEDURE — 85018 HEMOGLOBIN: CPT | Performed by: PHYSICIAN ASSISTANT

## 2020-07-09 PROCEDURE — 36415 COLL VENOUS BLD VENIPUNCTURE: CPT | Performed by: PHYSICIAN ASSISTANT

## 2020-07-09 PROCEDURE — 25000128 H RX IP 250 OP 636: Performed by: PHYSICIAN ASSISTANT

## 2020-07-09 PROCEDURE — 25000132 ZZH RX MED GY IP 250 OP 250 PS 637: Performed by: PHYSICIAN ASSISTANT

## 2020-07-09 PROCEDURE — 97161 PT EVAL LOW COMPLEX 20 MIN: CPT | Mod: GP | Performed by: PHYSICAL THERAPIST

## 2020-07-09 RX ORDER — ASPIRIN 325 MG
325 TABLET, DELAYED RELEASE (ENTERIC COATED) ORAL DAILY
Qty: 30 TABLET | Refills: 0 | Status: SHIPPED | OUTPATIENT
Start: 2020-07-10

## 2020-07-09 RX ORDER — OXYCODONE HYDROCHLORIDE 5 MG/1
5-10 TABLET ORAL
Qty: 45 TABLET | Refills: 0 | Status: SHIPPED | OUTPATIENT
Start: 2020-07-09

## 2020-07-09 RX ORDER — ACETAMINOPHEN 325 MG/1
650 TABLET ORAL EVERY 6 HOURS PRN
Qty: 60 TABLET | Refills: 0 | Status: SHIPPED | OUTPATIENT
Start: 2020-07-09

## 2020-07-09 RX ORDER — IBUPROFEN 600 MG/1
600 TABLET, FILM COATED ORAL EVERY 6 HOURS PRN
Qty: 60 TABLET | Refills: 0 | Status: SHIPPED | OUTPATIENT
Start: 2020-07-09

## 2020-07-09 RX ORDER — AMOXICILLIN 250 MG
2 CAPSULE ORAL 2 TIMES DAILY PRN
Qty: 60 TABLET | Refills: 0 | Status: SHIPPED | OUTPATIENT
Start: 2020-07-09

## 2020-07-09 RX ORDER — ONDANSETRON 4 MG/1
4 TABLET, ORALLY DISINTEGRATING ORAL EVERY 6 HOURS PRN
Qty: 10 TABLET | Refills: 0 | Status: SHIPPED | OUTPATIENT
Start: 2020-07-09

## 2020-07-09 RX ADMIN — ACETAMINOPHEN 975 MG: 325 TABLET, FILM COATED ORAL at 07:32

## 2020-07-09 RX ADMIN — ASPIRIN 325 MG: 325 TABLET, COATED ORAL at 07:32

## 2020-07-09 RX ADMIN — OXYCODONE HYDROCHLORIDE 10 MG: 5 TABLET ORAL at 07:31

## 2020-07-09 RX ADMIN — ACETAMINOPHEN 975 MG: 325 TABLET, FILM COATED ORAL at 00:02

## 2020-07-09 RX ADMIN — DOCUSATE SODIUM AND SENNOSIDES 2 TABLET: 8.6; 5 TABLET, FILM COATED ORAL at 07:32

## 2020-07-09 RX ADMIN — OXYCODONE HYDROCHLORIDE 10 MG: 5 TABLET ORAL at 02:07

## 2020-07-09 RX ADMIN — OXYCODONE HYDROCHLORIDE 10 MG: 5 TABLET ORAL at 14:40

## 2020-07-09 RX ADMIN — FLUTICASONE FUROATE AND VILANTEROL TRIFENATATE 1 PUFF: 200; 25 POWDER RESPIRATORY (INHALATION) at 07:57

## 2020-07-09 RX ADMIN — CEFAZOLIN SODIUM 2 G: 2 INJECTION, SOLUTION INTRAVENOUS at 00:03

## 2020-07-09 RX ADMIN — SODIUM CHLORIDE: 9 INJECTION, SOLUTION INTRAVENOUS at 02:09

## 2020-07-09 RX ADMIN — OXYCODONE HYDROCHLORIDE 10 MG: 5 TABLET ORAL at 11:25

## 2020-07-09 RX ADMIN — ACETAMINOPHEN 975 MG: 325 TABLET, FILM COATED ORAL at 14:41

## 2020-07-09 ASSESSMENT — ACTIVITIES OF DAILY LIVING (ADL)
ADLS_ACUITY_SCORE: 20
ADLS_ACUITY_SCORE: 20
ADLS_ACUITY_SCORE: 12
ADLS_ACUITY_SCORE: 20

## 2020-07-09 NOTE — PROVIDER NOTIFICATION
Called and left a message with shalom robledo  Explained chris had declined a need for a hospitalist consult regarding patient's BP  Still high today wondering if hospitalist needs to clear or follow up outpatient.  She called back no hospitalist is needed

## 2020-07-09 NOTE — DISCHARGE SUMMARY
Physician Discharge Summary     Patient ID:  Damián Petit  [id number]  39 year old  1980    Admit date: 7/7/2020    Discharge date and time: 7/9/2020     Admitting Physician: Ramakrishna Degroot MD     Discharge Physician: Gerda Grewal PAC    Admission Diagnoses: Closed fracture of left tibia and fibula, initial encounter [S82.202A, S82.402A]    Discharge Diagnoses: Same    Admission Condition: good    Discharged Condition: good    Indication for Admission: ORIF of left tibia fracture with pain control    Hospital Course: Uneventful ORIF of tibia with IM nail. Discharged next day    Consults: none    Significant Diagnostic Studies: radiology: CXR: Displaced left tibia fracture    Treatments: surgery: IM nail left tibia    Discharge Exam:  Dressings on knee clean, dry, and intact. Distal medial dressing with some bloody drainage, not saturated and stable  Mild knee effusion  Minimal erythema of the surrounding skin.   Bilateral calves are soft, non-tender.  Moderate swelling left LE without pitting edema  Compartments of LLE soft and compressible  Bilateral lower extremity is NVI.  Sensation intact bilateral lower extremities  Able to actively move toes  +Dp pulse    Disposition: home    Patient Instructions:   Current Discharge Medication List      START taking these medications    Details   acetaminophen (TYLENOL) 325 MG tablet Take 2 tablets (650 mg) by mouth every 6 hours as needed for pain  Qty: 60 tablet, Refills: 0    Associated Diagnoses: Closed fracture of left tibia and fibula, initial encounter      aspirin (ASA) 325 MG EC tablet Take 1 tablet (325 mg) by mouth daily  Qty: 30 tablet, Refills: 0    Associated Diagnoses: Closed fracture of left tibia and fibula, initial encounter      ibuprofen (ADVIL/MOTRIN) 600 MG tablet Take 1 tablet (600 mg) by mouth every 6 hours as needed for other (mild pain)  Qty: 60 tablet, Refills: 0    Associated Diagnoses: Closed fracture of left tibia and fibula,  initial encounter      ondansetron (ZOFRAN-ODT) 4 MG ODT tab Take 1 tablet (4 mg) by mouth every 6 hours as needed for nausea or vomiting  Qty: 10 tablet, Refills: 0    Associated Diagnoses: Closed fracture of left tibia and fibula, initial encounter      oxyCODONE (ROXICODONE) 5 MG tablet Take 1-2 tablets (5-10 mg) by mouth every 3 hours as needed for moderate to severe pain (One tab for moderate pain, two tabs for severe pain)  Qty: 45 tablet, Refills: 0    Associated Diagnoses: Closed fracture of left tibia and fibula, initial encounter      senna-docusate (SENOKOT-S/PERICOLACE) 8.6-50 MG tablet Take 2 tablets by mouth 2 times daily as needed for constipation  Qty: 60 tablet, Refills: 0    Associated Diagnoses: Closed fracture of left tibia and fibula, initial encounter         CONTINUE these medications which have NOT CHANGED    Details   mometasone-formoterol (DULERA) 200-5 MCG/ACT inhaler Inhale 2 puffs into the lungs 2 times daily      albuterol (PROAIR HFA/PROVENTIL HFA/VENTOLIN HFA) 108 (90 Base) MCG/ACT inhaler Inhale 2 puffs into the lungs every 4 hours as needed for shortness of breath / dyspnea or wheezing    Comments: Pharmacy may dispense brand covered by insurance (Proair, or proventil or ventolin or generic albuterol inhaler)           Activity: activity as tolerated and Flat foot WB   Diet: regular diet  Wound Care: none needed    [facility] [place], within 14 days  to evaluate after surgery. No follow up labs or test are needed prior to visit. At this visit x-rays will be taken, sutures/staples removed, and questions to be answered.  Bring any needed forms with to appointment.  Call for appointment (Juana- Patient Coordinator): [phone]  After hours: [phone]      Signed:  Gerda Grewal PA-C  7/9/2020  12:28 PM

## 2020-07-09 NOTE — DISCHARGE INSTRUCTIONS
Follow up with primary care doctor regarding blood pressure fi still running >140\90  Take blood pressure daily and record bring record to primary care visit.     Call primary care if your asthma is not controlled with your current inhalers    Use incentive spirometer 5-10 times an hour.

## 2020-07-09 NOTE — PROGRESS NOTES
Orthopedic Surgery  Damián Petit  2020  Admit Date:  2020  POD 1 Day Post-Op  S/P Procedure(s):  Open reduction internal fixation intramedullary rodding left tibia    Patient resting comfortably in bed.    Pain controlled.  Tolerating oral intake.    Denies nausea or vomiting  Denies chest pain or shortness of breath  No events overnight.     Alert and orient to person, place, and time.  Vital Sign Ranges  Temperature Temp  Av.9  F (36.6  C)  Min: 97.1  F (36.2  C)  Max: 98.9  F (37.2  C)   Blood pressure Systolic (24hrs), Av , Min:133 , Max:166        Diastolic (24hrs), Av, Min:79, Max:110      Pulse Pulse  Av.4  Min: 80  Max: 96   Respirations Resp  Av.7  Min: 8  Max: 22   Pulse oximetry SpO2  Av %  Min: 86 %  Max: 100 %       Dressings on knee clean, dry, and intact. Distal medial dressing with some bloody drainage, not saturated and stable  Mild knee effusion  Minimal erythema of the surrounding skin.   Bilateral calves are soft, non-tender.  Moderate swelling left LE without pitting edema  Compartments of LLE soft and compressible  Bilateral lower extremity is NVI.  Sensation intact bilateral lower extremities  Able to actively move toes  +Dp pulse    Labs:  Recent Labs   Lab Test 20  0700 20  2139 20  1106   POTASSIUM 3.7 3.5 3.0*     Recent Labs   Lab Test 20  0612 20  1435 20  1106   HGB 12.8* 15.5 15.1     No results for input(s): INR in the last 23729 hours.  Recent Labs   Lab Test 20  1435 20  1106    375       A/P  1. Left distal tibia IM nail   Continue ASA for DVT prophylaxis.     Mobilize with PT/OT    Flat foot WB with walker or crutches.     Continue current pain regiment.   Leave dressings intact    2. Disposition   Anticipate d/c to home today or tomorrow based on pain control and PT progress.    Gerda Grewal PA-C

## 2020-07-09 NOTE — PROGRESS NOTES
07/09/20 0939   Quick Adds   Type of Visit Initial PT Evaluation   Living Environment   Lives With spouse   Living Arrangements house   Home Accessibility stairs within home   Number of Stairs, Within Home, Primary 10   Stair Railings, Within Home, Primary railing on left side (ascending)   Transportation Anticipated family or friend will provide   Living Environment Comment Pt does not need to access basement, no stairs to enter   Self-Care   Usual Activity Tolerance good   Current Activity Tolerance moderate   Regular Exercise No   Equipment Currently Used at Home none   Activity/Exercise/Self-Care Comment owns crutches   Functional Level Prior   Ambulation 0-->independent   Transferring 0-->independent   Toileting 0-->independent   Bathing 0-->independent   Fall history within last six months yes   Number of times patient has fallen within last six months 1   Which of the above functional risks had a recent onset or change? ambulation;transferring;toileting;bathing   General Information   Onset of Illness/Injury or Date of Surgery - Date 07/08/20   Referring Physician Carrie Russell PA-C    Patient/Family Goals Statement Discharge to home   Pertinent History of Current Problem (include personal factors and/or comorbidities that impact the POC) Damián Petit is a 39 year old male who presents to the ED via EMS for evaluation of leg pain after a fall. The patient reports that he fell off a seven foot ladder and landed on his left foot earlier this morning. He experienced the immediate onset of leg pain and noticed a deformity to his lower left leg, prompting him to call EMS. EMS did apply a splint at the scene. Pt underwent ORIF 7/8.    Precautions/Limitations fall precautions   Weight-Bearing Status - LLE touch down weight-bearing   Weight-Bearing Status - RLE full weight-bearing   General Observations Pt supine upon initiation, agreeable to session.    Cognitive Status Examination   Orientation  "orientation to person, place and time   Level of Consciousness alert   Follows Commands and Answers Questions 100% of the time   Personal Safety and Judgment intact   Memory intact   Pain Assessment   Patient Currently in Pain Yes, see Vital Sign flowsheet   Integumentary/Edema   Integumentary/Edema Comments Dressings intact to L LE   Posture    Posture Forward head position;Protracted shoulders   Range of Motion (ROM)   ROM Comment WFL   Strength   Strength Comments WFL   Bed Mobility   Bed Mobility Comments sit<>supine SBA   Transfer Skills   Transfer Comments sit<>stand SBA   Gait   Gait Comments CGA with axillary crutches   Balance   Balance Comments Requires B UE support for gait   Sensory Examination   Sensory Perception no deficits were identified   Coordination   Coordination no deficits were identified   Muscle Tone   Muscle Tone no deficits were identified   Modality Interventions   Planned Modality Interventions Cryotherapy   Planned Modality Interventions Comments Ice PRN   General Therapy Interventions   Planned Therapy Interventions bed mobility training;gait training;transfer training;home program guidelines;progressive activity/exercise   Clinical Impression   Criteria for Skilled Therapeutic Intervention yes, treatment indicated   PT Diagnosis Impaired functional mobility   Influenced by the following impairments Pain, WB status   Functional limitations due to impairments Difficulty with bed mobility, transfers, gait   Clinical Presentation Stable/Uncomplicated   Clinical Presentation Rationale medically stable, clear POC   Clinical Decision Making (Complexity) Low complexity   Therapy Frequency Daily   Predicted Duration of Therapy Intervention (days/wks) 1 day   Anticipated Discharge Disposition Home with Assist   Risk & Benefits of therapy have been explained Yes   Patient, Family & other staff in agreement with plan of care Yes   [facility] AM-PAC TM \"6 Clicks\"   2016, [employer] of [employer] " "[employer], under license to [employer].  All rights reserved.   6 Clicks Short Forms Basic Mobility Inpatient Short Form   [facility] AM-PAC  \"6 Clicks\" V.2 Basic Mobility Inpatient Short Form   1. Turning from your back to your side while in a flat bed without using bedrails? 4 - None   2. Moving from lying on your back to sitting on the side of a flat bed without using bedrails? 3 - A Little   3. Moving to and from a bed to a chair (including a wheelchair)? 3 - A Little   4. Standing up from a chair using your arms (e.g., wheelchair, or bedside chair)? 3 - A Little   5. To walk in hospital room? 3 - A Little   6. Climbing 3-5 steps with a railing? 3 - A Little   Basic Mobility Raw Score (Score out of 24.Lower scores equate to lower levels of function) 19   Total Evaluation Time   Total Evaluation Time (Minutes) 5     "

## 2020-07-09 NOTE — PLAN OF CARE
DAy RN  BP eleavted (pa ortho stated no need for hospitalist)  RR 18-24 when walking stated breathing better than yesterday, lungs were clear and not dim like yesterday. No SOb at rest.   Encouraged prn inhaler and nebs at home along with IS, wife and patient comfortable with that, encouraged follow up with primary if continued issues.  02 sats stable above 90% this shift.   Up with SBA and crutches passed pt in am.  Pain controlled with oxycodone and tylenol  Ate drank and voided well.  Discharge to home with wife . Discharge instructions follow up care and meds were all reviewed with patient and wife. Filled oxycodone, tylenol senna, asa and zofran was given.

## 2020-07-09 NOTE — PLAN OF CARE
"Discharge Planner OT   Patient plan for discharge: not stated to writer  Current status: OT orders received, per chart patient s/p ORIF of L tibia. Per PT note, patient reports living in a house with no stairs to enter and a full flight to basement. Pt does not need to access basement level. Pt lives with a spouse that will be available for the first couple days after discharge and PRN beyond that.   Per PT note \"Pt completes sit<>supine with SBA, single cue for LE placement to avoid pushing downward into heel while completing transfer. Pt completes sit<>stand with SBA, cues for LE placement. Pt ambulates 50' with axillary crutches progressing from CGA to Basilio. Minimal cuing for WB status at initiation of gait. Discussed appropriate stair management. Discussed safe shower transfer, and use of shower chair for improved safety\".  Barriers to return to prior living situation: defer to PT  Recommendations for discharge: defer to PT  Rationale for recommendations: Per PT, patient manning shave IP OT needs, therefore, will discontinue OT orders and defer further recommendations to PT.       Entered by: Migdalia Mayer 07/09/2020 11:16 AM       "

## 2020-07-09 NOTE — PLAN OF CARE
A&OX4, VSS: BP elevated at times. On 2L oxygen NC.  Didn't get OOB, repositioning independently. CMS: tingling on LLE. Drsg: CDI.  LLE elevated with pillow. Pain mannaged with scheduled Tylenol and PRN Oxycodone. Voidiing adequately, passing flatus. Nursing continue to monitor

## 2020-07-09 NOTE — PLAN OF CARE
PT: Orders received. PT evaluation completed and treatment initiated. Pt reports living in a house with no stairs to enter and a full flight to basement. Pt does not need to access basement level. Pt lives with a spouse that will be available for the first couple days after discharge and PRN beyond that. Pt owns crutches from previous ACL repairs and is familiar with their use.      Discharge Planner PT   Patient plan for discharge: Home  Current status: Pt supine upon initiation, agreeable to session. Pt educated in WB status and it's importance in proper healing. Pt completes sit<>supine with SBA, single cue for LE placement to avoid pushing downward into heel while completing transfer. Pt completes sit<>stand with SBA, cues for LE placement. Pt ambulates 50' with axillary crutches progressing from CGA to Bsailio. Minimal cuing for WB status at initiation of gait. Discussed appropriate stair management. Discussed safe shower transfer, and use of shower chair for improved safety. Pt with multiple questions regarding expected course of healing/OP PT. Answered all to pt's satisfaction. Pt supine at end of session, all needs in reach.   Barriers to return to prior living situation: None anticipated  Recommendations for discharge: Home  Rationale for recommendations: Pt has demonstrated the ability to complete all mobility safely.        Entered by: Fernanda Be 07/09/2020 9:46 AM       Physical Therapy Discharge Summary    Reason for therapy discharge:    All goals and outcomes met, no further needs identified.    Progress towards therapy goal(s). See goals on Care Plan in [facility] electronic health record for goal details.  Goals met    Therapy recommendation(s):    No further therapy is recommended.

## 2020-07-12 ENCOUNTER — NURSE TRIAGE (OUTPATIENT)
Dept: NURSING | Facility: CLINIC | Age: 40
End: 2020-07-12

## 2020-07-13 NOTE — TELEPHONE ENCOUNTER
Wife, Josemanuel, calling - verbal consent by patient over the phone.    Says he had surgery on Wednesday - jillian placed for broken leg.  Was discharged from hospital Thursday.  Was told to call right away if he develops a fever.  Says patient has a fever of 100.6 and his leg is very warm to the touch.    Triaged to disposition of Go to ED Now.    Nevaeh Kulkarni RN  Triage Nurse Advisor    COVID 19 Nurse Triage Plan/Patient Instructions    Please be aware that novel coronavirus (COVID-19) may be circulating in the community. If you develop symptoms such as fever, cough, or SOB or if you have concerns about the presence of another infection including coronavirus (COVID-19), please contact your health care provider or visit [email or web address].     Disposition/Instructions    ED Visit recommended. Follow protocol based instructions.     Bring Your Own Device:  Please also bring your smart device(s) (smart phones, tablets, laptops) and their charging cables for your personal use and to communicate with your care team during your visit.    Thank you for taking steps to prevent the spread of this virus.  o Limit your contact with others.  o Wear a simple mask to cover your cough.  o Wash your hands well and often.    Resources    [facility]: About COVID-19: [email or web address]    CDC: What to Do If You're Sick: www.cdc.gov/coronavirus/2019-ncov/about/steps-when-sick.html    CDC: Ending Home Isolation: www.cdc.gov/coronavirus/2019-ncov/hcp/disposition-in-home-patients.html     CDC: Caring for Someone: www.cdc.gov/coronavirus/2019-ncov/if-you-are-sick/care-for-someone.html     [facility]: Interim Guidance for Hospital Discharge to Home: www.health.[email or web address].[place]/diseases/coronavirus/hcp/hospdischarge.pdf    [facility] clinical trials (COVID-19 research studies): [email or web address].[email or web address]-trials     Below are the COVID-19 hotlines at the [place] [facility] ([facility]). Interpreters are available.    o For health questions: Call 633-931-8890 or 1-683.433.3203 (7 a.m. to 7 p.m.)  o For questions about schools and childcare: Call 757-707-8737 or 1-898.190.5932 (7 a.m. to 7 p.m.)       Additional Information    Fever > 100.4 F (38.0 C)     Office closed - recommended ED now.    Protocols used: POST-OP SYMPTOMS AND [id number]

## 2020-07-14 ENCOUNTER — DOCUMENTATION ONLY (OUTPATIENT)
Dept: OTHER | Facility: CLINIC | Age: 40
End: 2020-07-14

## (undated) DEVICE — PACK LOWER EXTREMITY RIDGES

## (undated) DEVICE — LINEN HALF SHEET 5512

## (undated) DEVICE — GLOVE PROTEXIS POWDER FREE 8.0 ORTHOPEDIC 2D73ET80

## (undated) DEVICE — PREP CHLORAPREP 26ML TINTED ORANGE  260815

## (undated) DEVICE — DRAPE X-RAY TUBE 00-901169-01-OEC

## (undated) DEVICE — SU VICRYL 2-0 CT-1 36" UND J945H

## (undated) DEVICE — CAST PADDING 4" UNSTERILE 9044

## (undated) DEVICE — Device

## (undated) DEVICE — DRAPE STERI INCISE 1050

## (undated) DEVICE — DRILL BIT QUICK COUPLING 3 FLUTE 4.2MMX330/100MM CALIBRATE

## (undated) DEVICE — DRSG XEROFORM 1X8"

## (undated) DEVICE — DRSG TEGADERM 4X4 3/4" 1626W

## (undated) DEVICE — TOURNIQUET CUFF 30" REPRO BLUE 60-7070-105

## (undated) DEVICE — GLOVE PROTEXIS BLUE W/NEU-THERA 8.0  2D73EB80

## (undated) DEVICE — SU STRATAFIX MONOCRYL 3-0 SPIRAL PS-1 45CM SXMP1B102

## (undated) DEVICE — IMPLANTABLE DEVICE: Type: IMPLANTABLE DEVICE | Site: LEG | Status: NON-FUNCTIONAL

## (undated) DEVICE — PAD FOAM TRIANGLE KNEE 193-P

## (undated) DEVICE — STPL SKIN 35W 6.9MM  PXW35

## (undated) DEVICE — DRAPE C-ARMOR 5 SIDED 5523

## (undated) DEVICE — LINEN FULL SHEET 5511

## (undated) DEVICE — SU VICRYL 0 CT-1 27" J340H

## (undated) DEVICE — ROD SYN REAMER BALL TIP 2.5X950MM  351.706S

## (undated) DEVICE — BLADE KNIFE SURG 10 371110

## (undated) DEVICE — LINEN DRAPE 54X72" 5467

## (undated) DEVICE — DRAPE CONVERTORS U-DRAPE 60X72" 8476

## (undated) DEVICE — WIRE GUIDE 3.2X400MM  357.399

## (undated) DEVICE — ESU GROUND PAD ADULT W/CORD E7507

## (undated) DEVICE — DRILL BIT QUICK COUPLING 3 FLUTE 4.2MMX145MM NDL  POINT

## (undated) DEVICE — BAG CLEAR TRASH 1.3M 39X33" P4040C

## (undated) RX ORDER — DEXAMETHASONE SODIUM PHOSPHATE 4 MG/ML
INJECTION, SOLUTION INTRA-ARTICULAR; INTRALESIONAL; INTRAMUSCULAR; INTRAVENOUS; SOFT TISSUE
Status: DISPENSED
Start: 2020-07-08

## (undated) RX ORDER — HYDRALAZINE HYDROCHLORIDE 20 MG/ML
INJECTION INTRAMUSCULAR; INTRAVENOUS
Status: DISPENSED
Start: 2020-07-08

## (undated) RX ORDER — FENTANYL CITRATE 50 UG/ML
INJECTION, SOLUTION INTRAMUSCULAR; INTRAVENOUS
Status: DISPENSED
Start: 2020-07-08

## (undated) RX ORDER — METHYLPREDNISOLONE SODIUM SUCCINATE 125 MG/2ML
INJECTION, POWDER, LYOPHILIZED, FOR SOLUTION INTRAMUSCULAR; INTRAVENOUS
Status: DISPENSED
Start: 2020-07-08

## (undated) RX ORDER — PROPOFOL 10 MG/ML
INJECTION, EMULSION INTRAVENOUS
Status: DISPENSED
Start: 2020-07-08

## (undated) RX ORDER — LIDOCAINE HYDROCHLORIDE 10 MG/ML
INJECTION, SOLUTION EPIDURAL; INFILTRATION; INTRACAUDAL; PERINEURAL
Status: DISPENSED
Start: 2020-07-08

## (undated) RX ORDER — ONDANSETRON 2 MG/ML
INJECTION INTRAMUSCULAR; INTRAVENOUS
Status: DISPENSED
Start: 2020-07-08

## (undated) RX ORDER — CEFAZOLIN SODIUM 2 G/100ML
INJECTION, SOLUTION INTRAVENOUS
Status: DISPENSED
Start: 2020-07-08

## (undated) RX ORDER — METOPROLOL TARTRATE 1 MG/ML
INJECTION, SOLUTION INTRAVENOUS
Status: DISPENSED
Start: 2020-07-08

## (undated) RX ORDER — PHENYLEPHRINE HCL IN 0.9% NACL 1 MG/10 ML
SYRINGE (ML) INTRAVENOUS
Status: DISPENSED
Start: 2020-07-08

## (undated) RX ORDER — EPHEDRINE SULFATE 50 MG/ML
INJECTION, SOLUTION INTRAMUSCULAR; INTRAVENOUS; SUBCUTANEOUS
Status: DISPENSED
Start: 2020-07-08

## (undated) RX ORDER — BUPIVACAINE HYDROCHLORIDE AND EPINEPHRINE 5; 5 MG/ML; UG/ML
INJECTION, SOLUTION EPIDURAL; INTRACAUDAL; PERINEURAL
Status: DISPENSED
Start: 2020-07-08

## (undated) RX ORDER — LABETALOL 20 MG/4 ML (5 MG/ML) INTRAVENOUS SYRINGE
Status: DISPENSED
Start: 2020-07-08

## (undated) RX ORDER — OXYCODONE HYDROCHLORIDE 5 MG/1
TABLET ORAL
Status: DISPENSED
Start: 2020-07-08

## (undated) RX ORDER — CEFAZOLIN SODIUM 1 G/3ML
INJECTION, POWDER, FOR SOLUTION INTRAMUSCULAR; INTRAVENOUS
Status: DISPENSED
Start: 2020-07-08